# Patient Record
Sex: MALE | Race: WHITE | Employment: FULL TIME | URBAN - METROPOLITAN AREA
[De-identification: names, ages, dates, MRNs, and addresses within clinical notes are randomized per-mention and may not be internally consistent; named-entity substitution may affect disease eponyms.]

---

## 2017-02-27 ENCOUNTER — TELEPHONE (OUTPATIENT)
Dept: FAMILY MEDICINE CLINIC | Age: 67
End: 2017-02-27

## 2017-02-27 DIAGNOSIS — E11.9 TYPE 2 DIABETES MELLITUS WITHOUT COMPLICATION, WITHOUT LONG-TERM CURRENT USE OF INSULIN (HCC): Primary | ICD-10-CM

## 2017-02-27 DIAGNOSIS — E11.9 TYPE 2 DIABETES MELLITUS WITHOUT COMPLICATION, WITHOUT LONG-TERM CURRENT USE OF INSULIN (HCC): ICD-10-CM

## 2017-02-27 DIAGNOSIS — E78.00 PURE HYPERCHOLESTEROLEMIA: ICD-10-CM

## 2017-02-27 DIAGNOSIS — Z12.5 PROSTATE CANCER SCREENING: ICD-10-CM

## 2017-02-27 DIAGNOSIS — E55.9 HYPOVITAMINOSIS D: ICD-10-CM

## 2017-02-27 NOTE — TELEPHONE ENCOUNTER
Pt is requesting a referral to see a Vascular Dr Evelyne Marcus  Reason: ultrasound on lower exterminties  Tel:  [phone]  Fax: [phone] attn Julia Dewey

## 2017-02-27 NOTE — TELEPHONE ENCOUNTER
This is being handled by our referrals coordinator.   She has reported to me that the patient does not actually need a referral.

## 2017-03-03 ENCOUNTER — HOSPITAL ENCOUNTER (OUTPATIENT)
Dept: LAB | Age: 67
Discharge: HOME | End: 2017-03-03

## 2017-03-03 PROCEDURE — 99001 SPECIMEN HANDLING PT-LAB: CPT | Performed by: INTERNAL MEDICINE

## 2017-03-04 LAB
25(OH)D3+25(OH)D2 SERPL-MCNC: 25.3 NG/ML (ref 30–100)
ALBUMIN SERPL-MCNC: 4.3 G/DL (ref 3.6–4.8)
ALBUMIN/CREAT UR: 8.6 MG/G CREAT (ref 0–30)
ALP SERPL-CCNC: 87 IU/L (ref 39–117)
ALT SERPL-CCNC: 13 IU/L (ref 0–44)
APPEARANCE UR: CLEAR
AST SERPL-CCNC: 14 IU/L (ref 0–40)
BASOPHILS # BLD AUTO: 0.1 X10E3/UL (ref 0–0.2)
BASOPHILS NFR BLD AUTO: 1 %
BILIRUB DIRECT SERPL-MCNC: 0.12 MG/DL (ref 0–0.4)
BILIRUB SERPL-MCNC: 0.4 MG/DL (ref 0–1.2)
BILIRUB UR QL STRIP: NEGATIVE
BUN SERPL-MCNC: 30 MG/DL (ref 8–27)
BUN/CREAT SERPL: 17 (ref 10–22)
CALCIUM SERPL-MCNC: 9.5 MG/DL (ref 8.6–10.2)
CHLORIDE SERPL-SCNC: 101 MMOL/L (ref 96–106)
CHOLEST SERPL-MCNC: 180 MG/DL (ref 100–199)
CO2 SERPL-SCNC: 21 MMOL/L (ref 18–29)
COLOR UR: YELLOW
CREAT SERPL-MCNC: 1.77 MG/DL (ref 0.76–1.27)
CREAT UR-MCNC: 196.5 MG/DL
EOSINOPHIL # BLD AUTO: 0.2 X10E3/UL (ref 0–0.4)
EOSINOPHIL NFR BLD AUTO: 2 %
ERYTHROCYTE [DISTWIDTH] IN BLOOD BY AUTOMATED COUNT: 13.7 % (ref 12.3–15.4)
GLUCOSE SERPL-MCNC: 123 MG/DL (ref 65–99)
GLUCOSE UR QL: NEGATIVE
HBA1C MFR BLD: 6.3 % (ref 4.8–5.6)
HCT VFR BLD AUTO: 39.4 % (ref 37.5–51)
HDLC SERPL-MCNC: 30 MG/DL
HGB BLD-MCNC: 13 G/DL (ref 12.6–17.7)
HGB UR QL STRIP: NEGATIVE
IMM GRANULOCYTES # BLD: 0 X10E3/UL (ref 0–0.1)
IMM GRANULOCYTES NFR BLD: 0 %
INTERPRETATION, 910389: NORMAL
INTERPRETATION: NORMAL
KETONES UR QL STRIP: NEGATIVE
LDLC SERPL CALC-MCNC: 103 MG/DL (ref 0–99)
LEUKOCYTE ESTERASE UR QL STRIP: NEGATIVE
LYMPHOCYTES # BLD AUTO: 2.3 X10E3/UL (ref 0.7–3.1)
LYMPHOCYTES NFR BLD AUTO: 28 %
Lab: NORMAL
MCH RBC QN AUTO: 28.9 PG (ref 26.6–33)
MCHC RBC AUTO-ENTMCNC: 33 G/DL (ref 31.5–35.7)
MCV RBC AUTO: 88 FL (ref 79–97)
MICRO URNS: NORMAL
MICROALBUMIN UR-MCNC: 16.9 UG/ML
MONOCYTES # BLD AUTO: 0.6 X10E3/UL (ref 0.1–0.9)
MONOCYTES NFR BLD AUTO: 8 %
NEUTROPHILS # BLD AUTO: 4.9 X10E3/UL (ref 1.4–7)
NEUTROPHILS NFR BLD AUTO: 61 %
NITRITE UR QL STRIP: NEGATIVE
PDF IMAGE, 910387: NORMAL
PH UR STRIP: 5 [PH] (ref 5–7.5)
PLATELET # BLD AUTO: 316 X10E3/UL (ref 150–379)
POTASSIUM SERPL-SCNC: 4.5 MMOL/L (ref 3.5–5.2)
PROT SERPL-MCNC: 7.2 G/DL (ref 6–8.5)
PROT UR QL STRIP: NEGATIVE
PSA SERPL-MCNC: 0.5 NG/ML (ref 0–4)
RBC # BLD AUTO: 4.5 X10E6/UL (ref 4.14–5.8)
SODIUM SERPL-SCNC: 139 MMOL/L (ref 134–144)
SP GR UR: 1.02 (ref 1–1.03)
T4 FREE SERPL-MCNC: 1.25 NG/DL (ref 0.82–1.77)
TRIGL SERPL-MCNC: 236 MG/DL (ref 0–149)
TSH SERPL DL<=0.005 MIU/L-ACNC: 1.77 UIU/ML (ref 0.45–4.5)
UROBILINOGEN UR STRIP-MCNC: 0.2 MG/DL (ref 0.2–1)
VLDLC SERPL CALC-MCNC: 47 MG/DL (ref 5–40)
WBC # BLD AUTO: 8.1 X10E3/UL (ref 3.4–10.8)

## 2017-03-24 ENCOUNTER — OFFICE VISIT (OUTPATIENT)
Dept: FAMILY MEDICINE CLINIC | Age: 67
End: 2017-03-24

## 2017-03-24 VITALS
BODY MASS INDEX: 28.58 KG/M2 | HEIGHT: 69 IN | OXYGEN SATURATION: 98 % | WEIGHT: 193 LBS | RESPIRATION RATE: 18 BRPM | TEMPERATURE: 98 F | DIASTOLIC BLOOD PRESSURE: 84 MMHG | HEART RATE: 73 BPM | SYSTOLIC BLOOD PRESSURE: 130 MMHG

## 2017-03-24 DIAGNOSIS — M79.604 PAIN IN BOTH LOWER EXTREMITIES: ICD-10-CM

## 2017-03-24 DIAGNOSIS — Z12.11 COLON CANCER SCREENING: ICD-10-CM

## 2017-03-24 DIAGNOSIS — E11.9 TYPE 2 DIABETES MELLITUS WITHOUT COMPLICATION, WITHOUT LONG-TERM CURRENT USE OF INSULIN (HCC): ICD-10-CM

## 2017-03-24 DIAGNOSIS — E55.9 HYPOVITAMINOSIS D: ICD-10-CM

## 2017-03-24 DIAGNOSIS — E78.00 PURE HYPERCHOLESTEROLEMIA: ICD-10-CM

## 2017-03-24 DIAGNOSIS — I77.1 ARTERIAL INSUFFICIENCY (HCC): ICD-10-CM

## 2017-03-24 DIAGNOSIS — M79.605 PAIN IN BOTH LOWER EXTREMITIES: ICD-10-CM

## 2017-03-24 DIAGNOSIS — Z00.00 ANNUAL PHYSICAL EXAM: Primary | ICD-10-CM

## 2017-03-24 DIAGNOSIS — I10 ESSENTIAL HYPERTENSION: ICD-10-CM

## 2017-03-24 DIAGNOSIS — I73.9 SEVERE ARTERIAL INSUFFICIENCY OF LEFT LOWER EXTREMITY (HCC): ICD-10-CM

## 2017-03-24 RX ORDER — LISINOPRIL AND HYDROCHLOROTHIAZIDE 12.5; 2 MG/1; MG/1
TABLET ORAL
Qty: 180 TAB | Refills: 2 | Status: SHIPPED | OUTPATIENT
Start: 2017-03-24 | End: 2022-04-19

## 2017-03-24 RX ORDER — AMLODIPINE BESYLATE 5 MG/1
5 TABLET ORAL DAILY
Qty: 90 TAB | Refills: 2 | Status: SHIPPED | OUTPATIENT
Start: 2017-03-24

## 2017-03-24 RX ORDER — GLIPIZIDE 2.5 MG/1
TABLET, EXTENDED RELEASE ORAL
Qty: 90 TAB | Refills: 2 | Status: SHIPPED | OUTPATIENT
Start: 2017-03-24

## 2017-03-24 RX ORDER — ATORVASTATIN CALCIUM 20 MG/1
TABLET, FILM COATED ORAL
Qty: 90 TAB | Refills: 2 | Status: SHIPPED | OUTPATIENT
Start: 2017-03-24

## 2017-03-24 RX ORDER — HYDROXYZINE 25 MG/1
25 TABLET, FILM COATED ORAL
Qty: 90 TAB | Refills: 2 | Status: SHIPPED | OUTPATIENT
Start: 2017-03-24 | End: 2022-04-19 | Stop reason: ALTCHOICE

## 2017-03-24 NOTE — PATIENT INSTRUCTIONS

## 2017-03-24 NOTE — PROGRESS NOTES
SUBJECTIVE:   Laura Cota is a 73 y.o. male presenting for his annual checkup. He is a diabetic and has not been compliant with follow care as we discussed before. His last visit to me was May 2016. Past Medical History:   Diagnosis Date    Chronic low back pain 5/4/2016    HLD (hyperlipidemia) 11/11/2015    HTN (hypertension) 10/20/2015    Hypertension 2009     [facility]       Allergies: Metformin     Current Outpatient Prescriptions   Medication Sig    glipiZIDE SR (GLUCOTROL XL) 2.5 mg CR tablet TAKE 1 TABLET DAILY    atorvastatin (LIPITOR) 20 mg tablet TAKE 1 TABLET DAILY    lisinopril-hydroCHLOROthiazide (PRINZIDE, ZESTORETIC) 20-12.5 mg per tablet TAKE 1 TABLET TWICE A DAY    amLODIPine (NORVASC) 5 mg tablet Take 1 Tab by mouth daily.  hydrOXYzine HCl (ATARAX) 25 mg tablet Take 1 Tab by mouth daily as needed for Itching.  glucose blood VI test strips (ONETOUCH ULTRA TEST) strip Once daily    Lancets misc Once daily     No current facility-administered medications for this visit. ROS:  Feeling well. No dyspnea or chest pain on exertion. No abdominal pain, change in bowel habits, black or bloody stools. No urinary tract or prostatic symptoms. No neurological complaints. OBJECTIVE:   The patient appears well, alert, oriented x 3, in no distress. Visit Vitals    /84    Pulse 73    Temp 98 °F (36.7 °C)    Resp 18    Ht 5' 9\" (1.753 m)    Wt 193 lb (87.5 kg)    SpO2 98%    BMI 28.5 kg/m2       General appearance: alert, cooperative, no distress, appears stated age  Head: Normocephalic, without obvious abnormality, atraumatic  Ears: normal TM's and external ear canals AU  Throat: Lips, mucosa, and tongue normal. Teeth and gums normal  Neck: supple, symmetrical, trachea midline, no adenopathy, thyroid: not enlarged, symmetric, no tenderness/mass/nodules, no carotid bruit and no JVD  Back: symmetric, no curvature.  ROM normal. No CVA tenderness. Lungs: clear to auscultation bilaterally  Heart: regular rate and rhythm, S1, S2 normal, no murmur, click, rub or gallop  Abdomen: soft, non-tender. Bowel sounds normal. No masses,  no organomegaly  Extremities: extremities normal, atraumatic, no cyanosis or edema  Skin: Skin color, texture, turgor normal. No rashes or lesions  Neurological is normal, no focal findings. Lab Results   Component Value Date/Time    WBC 8.1 03/03/2017 08:48 AM    HGB 13.0 03/03/2017 08:48 AM    HCT 39.4 03/03/2017 08:48 AM    PLATELET 784 72/66/5866 08:48 AM    MCV 88 03/03/2017 08:48 AM         Lab Results   Component Value Date/Time    Sodium 139 03/03/2017 08:48 AM    Potassium 4.5 03/03/2017 08:48 AM    Chloride 101 03/03/2017 08:48 AM    CO2 21 03/03/2017 08:48 AM    Glucose 123 03/03/2017 08:48 AM    BUN 30 03/03/2017 08:48 AM    Creatinine 1.77 03/03/2017 08:48 AM    BUN/Creatinine ratio 17 03/03/2017 08:48 AM    GFR est AA 46 03/03/2017 08:48 AM    GFR est non-AA 39 03/03/2017 08:48 AM    Calcium 9.5 03/03/2017 08:48 AM         Lab Results   Component Value Date/Time    ALT (SGPT) 13 03/03/2017 08:48 AM    AST (SGOT) 14 03/03/2017 08:48 AM    Alk.  phosphatase 87 03/03/2017 08:48 AM    Bilirubin, direct 0.12 03/03/2017 08:48 AM    Bilirubin, total 0.4 03/03/2017 08:48 AM         Lab Results   Component Value Date/Time    Cholesterol, total 180 03/03/2017 08:48 AM    HDL Cholesterol 30 03/03/2017 08:48 AM    LDL, calculated 103 03/03/2017 08:48 AM    VLDL, calculated 47 03/03/2017 08:48 AM    Triglyceride 236 03/03/2017 08:48 AM         Lab Results   Component Value Date/Time    TSH 1.770 03/03/2017 08:48 AM    T4, Free 1.25 03/03/2017 08:48 AM         Lab Results   Component Value Date/Time    VITAMIN D, 25-HYDROXY 25.3 03/03/2017 08:48 AM           ASSESSMENT:   healthy adult male    Kalin was seen today for complete physical.    Diagnoses and all orders for this visit:    Annual physical exam    Type 2 diabetes mellitus without complication, without long-term current use of insulin (HCC)  -     REFERRAL TO OPHTHALMOLOGY  -      DIABETES FOOT EXAM    Essential hypertension    Pure hypercholesterolemia  -     LIPID PANEL; Future  -     METABOLIC PANEL, BASIC; Future    Colon cancer screening  -     REFERRAL TO GASTROENTEROLOGY    Pain in both lower extremities  -     DUPLEX LOWER EXT VENOUS BILAT; Future  -     ANKLE BRACHIAL INDEX; Future    Hypovitaminosis D  -     VITAMIN D, 25 HYDROXY; Future    Other orders  -     glipiZIDE SR (GLUCOTROL XL) 2.5 mg CR tablet; TAKE 1 TABLET DAILY  -     atorvastatin (LIPITOR) 20 mg tablet; TAKE 1 TABLET DAILY  -     lisinopril-hydroCHLOROthiazide (PRINZIDE, ZESTORETIC) 20-12.5 mg per tablet; TAKE 1 TABLET TWICE A DAY  -     amLODIPine (NORVASC) 5 mg tablet; Take 1 Tab by mouth daily. -     hydrOXYzine HCl (ATARAX) 25 mg tablet; Take 1 Tab by mouth daily as needed for Itching. patient still dealing with pain, numbness and tingling in his legs when he walks. Improves when he stops, feels better when sitting vs standing. He was not very pleased with the vascular MD he once saw. He wants to have vascular studies done again and perhaps be referred to new specialist.      PLAN:   follow a low fat, low cholesterol diet and attempt to lose weight. The plan was discussed with the patient. The patient verbalized understanding and is in agreement with the plan. All medication potential side effects were discussed with the patient. Pt to f/u in 3 mon, repeat labs. Start Vit D 1000 units daily. Probiotics for dyspepsia, bloating, gas. Increase water, dietary fiber. Use Miralax as needed for constipation.

## 2017-03-24 NOTE — PROGRESS NOTES
Daryl Kearney is a 73 y.o. male here today for complete physical           1. Have you been to the ER, urgent care clinic since your last visit? Hospitalized since your last visit? No    2. Have you seen or consulted any other health care providers outside of the [address] [place] [address] since your last visit? Include any pap smears or colon screening.  Yes Where: Ortho, Cardiovascular, Neurosurgeon

## 2017-03-24 NOTE — MR AVS SNAPSHOT
Visit Information Date & Time Provider Department Dept. Phone Encounter #  
 3/24/2017  2:30 PM Alisa Holloway, [place] [id number] Upcoming Health Maintenance Date Due Hepatitis C Screening 1950 FOOT EXAM Q1 9/24/1961 DTaP/Tdap/Td series (1 - Tdap) 9/24/1972 FOBT Q 1 YEAR AGE 50-75 9/24/2001 ZOSTER VACCINE AGE 60> 9/24/2011 INFLUENZA AGE 9 TO ADULT 8/1/2016 Pneumococcal 65+ Low/Medium Risk (1 of 2 - PCV13) 9/24/2016 MEDICARE YEARLY EXAM 9/24/2016 EYE EXAM RETINAL OR DILATED Q1 3/28/2017 HEMOGLOBIN A1C Q6M 9/3/2017 MICROALBUMIN Q1 3/3/2018 LIPID PANEL Q1 3/3/2018 GLAUCOMA SCREENING Q2Y 3/28/2018 Allergies as of 3/24/2017  Review Complete On: 3/24/2017 By: Alisa Holloway MD  
  
 Severity Noted Reaction Type Reactions Metformin  11/11/2015    Diarrhea Current Immunizations  Reviewed on 3/24/2017 Name Date Influenza High Dose Vaccine PF 11/8/2016 Influenza Vaccine 10/20/2015 Tdap 6/14/2016 Reviewed by Alisa Holloway MD on 3/24/2017 at  2:47 PM  
You Were Diagnosed With   
  
 Codes Comments Annual physical exam    -  Primary ICD-10-CM: Z00.00 ICD-9-CM: V70.0 Type 2 diabetes mellitus without complication, without long-term current use of insulin (HCC)     ICD-10-CM: E11.9 ICD-9-CM: 250.00 Essential hypertension     ICD-10-CM: I10 
ICD-9-CM: 401.9 Pure hypercholesterolemia     ICD-10-CM: E78.00 ICD-9-CM: 272.0 Colon cancer screening     ICD-10-CM: Z12.11 ICD-9-CM: V76.51 Pain in both lower extremities     ICD-10-CM: M79.604, M79.605 ICD-9-CM: 729.5 Hypovitaminosis D     ICD-10-CM: E55.9 ICD-9-CM: 268.9 Vitals BP Pulse Temp Resp Height(growth percentile) Weight(growth percentile) 130/84 73 98 °F (36.7 °C) 18 5' 9\" (1.753 m) 193 lb (87.5 kg) SpO2 BMI Smoking Status 98% 28.5 kg/m2 Former Smoker Vitals History BMI and BSA Data Body Mass Index Body Surface Area 28.5 kg/m 2 2.06 m 2 Preferred Pharmacy Pharmacy Name Phone [address] [phone] Your Updated Medication List  
  
   
This list is accurate as of: 3/24/17  3:20 PM.  Always use your most recent med list. amLODIPine 5 mg tablet Commonly known as:  Aleena Koko Take 1 Tab by mouth daily. atorvastatin 20 mg tablet Commonly known as:  LIPITOR  
TAKE 1 TABLET DAILY  
  
 glipiZIDE SR 2.5 mg CR tablet Commonly known as:  GLUCOTROL XL  
TAKE 1 TABLET DAILY  
  
 glucose blood VI test strips strip Commonly known as:  ONETOUCH ULTRA TEST Once daily  
  
 hydrOXYzine HCl 25 mg tablet Commonly known as:  ATARAX Take 1 Tab by mouth daily as needed for Itching. Lancets Misc Once daily  
  
 lisinopril-hydroCHLOROthiazide 20-12.5 mg per tablet Commonly known as:  PRINZIDE, ZESTORETIC  
TAKE 1 TABLET TWICE A DAY Prescriptions Printed Refills  
 glipiZIDE SR (GLUCOTROL XL) 2.5 mg CR tablet 2 Sig: TAKE 1 TABLET DAILY Class: Print  
 atorvastatin (LIPITOR) 20 mg tablet 2 Sig: TAKE 1 TABLET DAILY Class: Print  
 lisinopril-hydroCHLOROthiazide (PRINZIDE, ZESTORETIC) 20-12.5 mg per tablet 2 Sig: TAKE 1 TABLET TWICE A DAY Class: Print  
 amLODIPine (NORVASC) 5 mg tablet 2 Sig: Take 1 Tab by mouth daily. Class: Print Route: Oral  
 hydrOXYzine HCl (ATARAX) 25 mg tablet 2 Sig: Take 1 Tab by mouth daily as needed for Itching. Class: Print Route: Oral  
  
We Performed the Following  DIABETES FOOT EXAM [HM7 Custom] REFERRAL TO GASTROENTEROLOGY [XKD83 Custom] Comments: PLEASE SCHEDULE PT FOR NEXT AVAILABLE CONSULTATION WITH [facility]. REFERRAL TO OPHTHALMOLOGY [REF57 Custom] Comments:  
 PT WILL MAKE APPT with Dr. Adalid Rodriguez at [facility] To-Do List   
 03/24/2017 Imaging:  ANKLE BRACHIAL INDEX   
  
 03/24/2017 Imaging:  DUPLEX LOWER EXT VENOUS BILAT   
  
 03/24/2017 Lab:  LIPID PANEL   
  
 03/24/2017 Lab:  METABOLIC PANEL, BASIC   
  
 03/24/2017 Lab:  VITAMIN D, 25 HYDROXY Referral Information Referral ID Referred By Referred To  
  
 [id number] [facility]   
   [address] [place], [address] Phone: [phone] Fax: [phone] Visits Status Start Date End Date 1 New Request 3/24/17 3/24/18 If your referral has a status of pending review or denied, additional information will be sent to support the outcome of this decision. Referral ID Referred By Referred To  
 [id number] Phuong MCCAIN Not Available Visits Status Start Date End Date 1 New Request 3/24/17 3/24/18 If your referral has a status of pending review or denied, additional information will be sent to support the outcome of this decision. Patient Instructions Nutrition Tips for Diabetes: After Your Visit Your Care Instructions A healthy diet is important to manage diabetes. It helps you lose weight (if you need to) and keep it off. It gives you the nutrition and energy your body needs and helps prevent heart disease. But a diet for diabetes does not mean that you have to eat special foods. You can eat what your family eats, including occasional sweets and other favorites. But you do have to pay attention to how often you eat and how much you eat of certain foods. The right plan for you will give you meals that help you keep your blood sugar at healthy levels. Try to eat a variety of foods and to spread carbohydrate throughout the day. Carbohydrate raises blood sugar higher and more quickly than any other nutrient does. Carbohydrate is found in sugar, breads and cereals, fruit, starchy vegetables such as potatoes and corn, and milk and yogurt. You may want to work with a dietitian or diabetes educator to help you plan meals and snacks. A dietitian or diabetes educator also can help you lose weight if that is one of your goals. The following tips can help you enjoy your meals and stay healthy. Follow-up care is a key part of your treatment and safety. Be sure to make and go to all appointments, and call your doctor if you are having problems. Its also a good idea to know your test results and keep a list of the medicines you take. How can you care for yourself at home? · Learn which foods have carbohydrate and how much carbohydrate to eat. A dietitian or diabetes educator can help you learn to keep track of how much carbohydrate you eat. · Spread carbohydrate throughout the day. Eat some carbohydrate at all meals, but do not eat too much at any one time. · Plan meals to include food from all the food groups. These are the food groups and some example portion sizes: ¨ Grains: 1 slice of bread (1 ounce), ½ cup of cooked cereal, and 1/3 cup of cooked pasta or rice. These have about 15 grams of carbohydrate in a serving. Choose whole grains such as whole wheat bread or crackers, oatmeal, and brown rice more often than refined grains. ¨ Fruit: 1 small fresh fruit, such as an apple or orange; ½ of a banana; ½ cup of chopped, cooked, or canned fruit; ½ cup of fruit juice; 1 cup of melon or raspberries; and 2 tablespoons of dried fruit. These have about 15 grams of carbohydrate in a serving. ¨ Dairy: 1 cup of nonfat or low-fat milk and 2/3 cup of plain yogurt. These have about 15 grams of carbohydrate in a serving. ¨ Protein foods: Beef, chicken, turkey, fish, eggs, tofu, cheese, cottage cheese, and peanut butter. A serving size of meat is 3 ounces, which is about the size of a deck of cards.  Examples of meat substitute serving sizes (equal to 1 ounce of meat) are 1/4 cup of cottage cheese, 1 egg, 1 tablespoon of peanut butter, and ½ cup of tofu. These have very little or no carbohydrate per serving. ¨ Vegetables: Starchy vegetables such as ½ cup of cooked dried beans, peas, potatoes, or corn have about 15 grams of carbohydrate. Nonstarchy vegetables have very little carbohydrate, such as 1 cup of raw leafy vegetables (such as spinach), ½ cup of other vegetables (cooked or chopped), and 3/4 cup of vegetable juice. · Use the plate format to plan meals. It is a good, quick way to make sure that you have a balanced meal. It also helps you spread carbohydrate throughout the day. You divide your plate by types of foods. Put vegetables on half the plate, meat or meat substitutes on one-quarter of the plate, and a grain or starchy vegetable (such as brown rice or a potato) in the final quarter of the plate. To this you can add a small piece of fruit and 1 cup of milk or yogurt, depending on how much carbohydrate you are supposed to eat at a meal. 
· Talk to your dietitian or diabetes educator about ways to add limited amounts of sweets into your meal plan. You can eat these foods now and then, as long as you include the amount of carbohydrate they have in your daily carbohydrate allowance. · If you drink alcohol, limit it to no more than 1 drink a day for women and 2 drinks a day for men. If you are pregnant, no amount of alcohol is known to be safe. · Protein, fat, and fiber do not raise blood sugar as much as carbohydrate does. If you eat a lot of these nutrients in a meal, your blood sugar will rise more slowly than it would otherwise. · Limit saturated fats, such as those from meat and dairy products. Try to replace it with monounsaturated fat, such as olive oil. This is a healthier choice because people who have diabetes are at higher-than-average risk of heart disease. But use a modest amount of olive oil. A tablespoon of olive oil has 14 grams of fat and 120 calories. · Exercise lowers blood sugar. If you take insulin by shots or pump, you can use less than you would if you were not exercising. Keep in mind that timing matters. If you exercise within 1 hour after a meal, your body may need less insulin for that meal than it would if you exercised 3 hours after the meal. Test your blood sugar to find out how exercise affects your need for insulin. · Exercise on most days of the week. Aim for at least 30 minutes. Exercise helps you stay at a healthy weight and helps your body use insulin. Walking is an easy way to get exercise. Gradually increase the amount you walk every day. You also may want to swim, bike, or do other activities. When you eat out · Learn to estimate the serving sizes of foods that have carbohydrate. If you measure food at home, it will be easier to estimate the amount in a serving of restaurant food. · If the meal you order has too much carbohydrate (such as potatoes, corn, or baked beans), ask to have a low-carbohydrate food instead. Ask for a salad or green vegetables. · If you use insulin, check your blood sugar before and after eating out to help you plan how much to eat in the future. · If you eat more carbohydrate at a meal than you had planned, take a walk or do other exercise. This will help lower your blood sugar. Where can you learn more? Go to [employer] Enter M464 in the search box to learn more about \"Nutrition Tips for Diabetes: After Your Visit. \"  
© 3093-7809 [employer]. Care instructions adapted under license by [facility] (which disclaims liability or warranty for this information). This care instruction is for use with your licensed healthcare professional. If you have questions about a medical condition or this instruction, always ask your healthcare professional. [address] any warranty or liability for your use of this information. Content Version: 26.0.399559; Current as of: June 4, 2014 Introducing [facility] SERVICES! Krystal Bhat introduces [employer] patient portal. Now you can access parts of your medical record, email your doctor's office, and request medication refills online. 1. In your internet browser, go to [email or web address] 2. Click on the First Time User? Click Here link in the Sign In box. You will see the New Member Sign Up page. 3. Enter your [employer] Access Code exactly as it appears below. You will not need to use this code after youve completed the sign-up process. If you do not sign up before the expiration date, you must request a new code. · [employer] Access Code: [email or web address] Expires: 6/22/2017  3:20 PM 
 
4. Enter the last four digits of your Social Security Number (xxxx) and Date of Birth (mm/dd/yyyy) as indicated and click Submit. You will be taken to the next sign-up page. 5. Create a [employer] ID. This will be your [employer] login ID and cannot be changed, so think of one that is secure and easy to remember. 6. Create a [employer] password. You can change your password at any time. 7. Enter your Password Reset Question and Answer. This can be used at a later time if you forget your password. 8. Enter your e-mail address. You will receive e-mail notification when new information is available in [address] 9. Click Sign Up. You can now view and download portions of your medical record. 10. Click the Download Summary menu link to download a portable copy of your medical information. If you have questions, please visit the Frequently Asked Questions section of the [employer] website. Remember, [employer] is NOT to be used for urgent needs. For medical emergencies, dial 911. Now available from your iPhone and Android! Please provide this summary of care documentation to your next provider. Your primary care clinician is listed as Rikki Arriaza. If you have any questions after today's visit, please call [phone].

## 2017-04-26 ENCOUNTER — TELEPHONE (OUTPATIENT)
Dept: FAMILY MEDICINE CLINIC | Age: 67
End: 2017-04-26

## 2017-04-26 NOTE — TELEPHONE ENCOUNTER
Needing additional diagnosis for the tests ordered, scheduled for tomorrow. Please contact back so they don't have to reschedule.   Fax: [phone]

## 2017-06-12 ENCOUNTER — ANESTHESIA EVENT (OUTPATIENT)
Dept: SURGERY | Age: 67
DRG: 039 | End: 2017-06-12
Payer: COMMERCIAL

## 2017-06-13 ENCOUNTER — HOSPITAL ENCOUNTER (INPATIENT)
Age: 67
LOS: 1 days | Discharge: HOME | DRG: 039 | End: 2017-06-14
Attending: SURGERY | Admitting: SURGERY
Payer: COMMERCIAL

## 2017-06-13 ENCOUNTER — ANESTHESIA (OUTPATIENT)
Dept: SURGERY | Age: 67
DRG: 039 | End: 2017-06-13
Payer: COMMERCIAL

## 2017-06-13 PROBLEM — I65.29 CAROTID ARTERY STENOSIS WITHOUT CEREBRAL INFARCTION: Status: ACTIVE | Noted: 2017-06-13

## 2017-06-13 LAB
ABO + RH BLD: NORMAL
ANION GAP BLD CALC-SCNC: 8 MMOL/L (ref 3–18)
ATRIAL RATE: 47 BPM
ATRIAL RATE: 59 BPM
BLOOD GROUP ANTIBODIES SERPL: NORMAL
BUN BLD-MCNC: 33 MG/DL (ref 7–18)
BUN SERPL-MCNC: 33 MG/DL (ref 7–18)
BUN/CREAT SERPL: 18 (ref 12–20)
CALCIUM SERPL-MCNC: 8.7 MG/DL (ref 8.5–10.1)
CALCULATED P AXIS, ECG09: 57 DEGREES
CALCULATED P AXIS, ECG09: 60 DEGREES
CALCULATED R AXIS, ECG10: 46 DEGREES
CALCULATED R AXIS, ECG10: 53 DEGREES
CALCULATED T AXIS, ECG11: 132 DEGREES
CALCULATED T AXIS, ECG11: 136 DEGREES
CHLORIDE BLD-SCNC: 109 MMOL/L (ref 100–108)
CHLORIDE SERPL-SCNC: 109 MMOL/L (ref 100–108)
CO2 SERPL-SCNC: 23 MMOL/L (ref 21–32)
CREAT SERPL-MCNC: 1.83 MG/DL (ref 0.6–1.3)
DIAGNOSIS, 93000: NORMAL
DIAGNOSIS, 93000: NORMAL
GLUCOSE BLD STRIP.AUTO-MCNC: 118 MG/DL (ref 70–110)
GLUCOSE BLD STRIP.AUTO-MCNC: 121 MG/DL (ref 74–106)
GLUCOSE SERPL-MCNC: 120 MG/DL (ref 74–99)
HCT VFR BLD CALC: 35 % (ref 36–49)
HGB BLD-MCNC: 11.9 G/DL (ref 12–16)
INR PPP: 1 (ref 0.8–1.2)
P-R INTERVAL, ECG05: 152 MS
P-R INTERVAL, ECG05: 170 MS
POTASSIUM BLD-SCNC: 4.6 MMOL/L (ref 3.5–5.5)
POTASSIUM SERPL-SCNC: 4.5 MMOL/L (ref 3.5–5.5)
PROTHROMBIN TIME: 12.8 SEC (ref 11.5–15.2)
Q-T INTERVAL, ECG07: 442 MS
Q-T INTERVAL, ECG07: 472 MS
QRS DURATION, ECG06: 86 MS
QRS DURATION, ECG06: 90 MS
QTC CALCULATION (BEZET), ECG08: 417 MS
QTC CALCULATION (BEZET), ECG08: 437 MS
SODIUM BLD-SCNC: 142 MMOL/L (ref 136–145)
SODIUM SERPL-SCNC: 140 MMOL/L (ref 136–145)
SPECIMEN EXP DATE BLD: NORMAL
VENTRICULAR RATE, ECG03: 47 BPM
VENTRICULAR RATE, ECG03: 59 BPM

## 2017-06-13 PROCEDURE — 86900 BLOOD TYPING SEROLOGIC ABO: CPT | Performed by: NURSE ANESTHETIST, CERTIFIED REGISTERED

## 2017-06-13 PROCEDURE — 77030003029 HC SUT VCRL J&J -B: Performed by: SURGERY

## 2017-06-13 PROCEDURE — 76060000036 HC ANESTHESIA 2.5 TO 3 HR: Performed by: SURGERY

## 2017-06-13 PROCEDURE — 65610000006 HC RM INTENSIVE CARE

## 2017-06-13 PROCEDURE — 77030005521 HC CATH URETH FOL38 BARD -B: Performed by: SURGERY

## 2017-06-13 PROCEDURE — 74011250636 HC RX REV CODE- 250/636: Performed by: NURSE ANESTHETIST, CERTIFIED REGISTERED

## 2017-06-13 PROCEDURE — 77030011640 HC PAD GRND REM COVD -A: Performed by: SURGERY

## 2017-06-13 PROCEDURE — 74011250637 HC RX REV CODE- 250/637

## 2017-06-13 PROCEDURE — 03CL0ZZ EXTIRPATION OF MATTER FROM LEFT INTERNAL CAROTID ARTERY, OPEN APPROACH: ICD-10-PCS | Performed by: SURGERY

## 2017-06-13 PROCEDURE — 77030012407 HC DRN WND BARD -B: Performed by: SURGERY

## 2017-06-13 PROCEDURE — 77030032490 HC SLV COMPR SCD KNE COVD -B: Performed by: SURGERY

## 2017-06-13 PROCEDURE — 74011000258 HC RX REV CODE- 258

## 2017-06-13 PROCEDURE — 77030013079 HC BLNKT BAIR HGGR 3M -A: Performed by: ANESTHESIOLOGY

## 2017-06-13 PROCEDURE — 74011250636 HC RX REV CODE- 250/636: Performed by: SURGERY

## 2017-06-13 PROCEDURE — 77030018836 HC SOL IRR NACL ICUM -A: Performed by: SURGERY

## 2017-06-13 PROCEDURE — 77030002996 HC SUT SLK J&J -A: Performed by: SURGERY

## 2017-06-13 PROCEDURE — 77030010507 HC ADH SKN DERMBND J&J -B: Performed by: SURGERY

## 2017-06-13 PROCEDURE — 03CN0ZZ EXTIRPATION OF MATTER FROM LEFT EXTERNAL CAROTID ARTERY, OPEN APPROACH: ICD-10-PCS | Performed by: SURGERY

## 2017-06-13 PROCEDURE — 85347 COAGULATION TIME ACTIVATED: CPT

## 2017-06-13 PROCEDURE — 76210000002 HC OR PH I REC 3 TO 3.5 HR: Performed by: SURGERY

## 2017-06-13 PROCEDURE — 77030027478 HC TBNG JP W BLB MRTM -B: Performed by: SURGERY

## 2017-06-13 PROCEDURE — 77030008683 HC TU ET CUF COVD -A: Performed by: ANESTHESIOLOGY

## 2017-06-13 PROCEDURE — 77030011894 HC TY FOL URIMTR BARD -B: Performed by: SURGERY

## 2017-06-13 PROCEDURE — 80048 BASIC METABOLIC PNL TOTAL CA: CPT | Performed by: ANESTHESIOLOGY

## 2017-06-13 PROCEDURE — 93882 EXTRACRANIAL UNI/LTD STUDY: CPT

## 2017-06-13 PROCEDURE — 77030002987 HC SUT PROL J&J -B: Performed by: SURGERY

## 2017-06-13 PROCEDURE — 77030011265 HC ELECTRD BLD HEX COVD -A: Performed by: SURGERY

## 2017-06-13 PROCEDURE — 76010000172 HC OR TIME 2.5 TO 3 HR INTENSV-TIER 1: Performed by: SURGERY

## 2017-06-13 PROCEDURE — 36415 COLL VENOUS BLD VENIPUNCTURE: CPT | Performed by: NURSE ANESTHETIST, CERTIFIED REGISTERED

## 2017-06-13 PROCEDURE — 93005 ELECTROCARDIOGRAM TRACING: CPT

## 2017-06-13 PROCEDURE — 74011000250 HC RX REV CODE- 250: Performed by: SURGERY

## 2017-06-13 PROCEDURE — 77030002933 HC SUT MCRYL J&J -A: Performed by: SURGERY

## 2017-06-13 PROCEDURE — 74011250636 HC RX REV CODE- 250/636

## 2017-06-13 PROCEDURE — 77030013797 HC KT TRNSDUC PRSSR EDWD -A: Performed by: ANESTHESIOLOGY

## 2017-06-13 PROCEDURE — 77030031139 HC SUT VCRL2 J&J -A: Performed by: SURGERY

## 2017-06-13 PROCEDURE — 74011000250 HC RX REV CODE- 250

## 2017-06-13 PROCEDURE — 77030021678 HC GLIDESCP STAT DISP VERT -B: Performed by: ANESTHESIOLOGY

## 2017-06-13 PROCEDURE — 03UL0KZ SUPPLEMENT LEFT INTERNAL CAROTID ARTERY WITH NONAUTOLOGOUS TISSUE SUBSTITUTE, OPEN APPROACH: ICD-10-PCS | Performed by: SURGERY

## 2017-06-13 PROCEDURE — 82962 GLUCOSE BLOOD TEST: CPT

## 2017-06-13 PROCEDURE — 77030018824 HC SHNT CAR ARGY COVD -B: Performed by: SURGERY

## 2017-06-13 PROCEDURE — 85610 PROTHROMBIN TIME: CPT | Performed by: ANESTHESIOLOGY

## 2017-06-13 PROCEDURE — 74011000272 HC RX REV CODE- 272: Performed by: SURGERY

## 2017-06-13 PROCEDURE — 77030008477 HC STYL SATN SLP COVD -A: Performed by: ANESTHESIOLOGY

## 2017-06-13 PROCEDURE — 77030005401 HC CATH RAD ARRO -A: Performed by: ANESTHESIOLOGY

## 2017-06-13 PROCEDURE — 82947 ASSAY GLUCOSE BLOOD QUANT: CPT

## 2017-06-13 PROCEDURE — C1768 GRAFT, VASCULAR: HCPCS | Performed by: SURGERY

## 2017-06-13 DEVICE — XENOSURE BIOLOGIC PATCH, 0.8CM X 8CM, EIFU
Type: IMPLANTABLE DEVICE | Site: CAROTID | Status: FUNCTIONAL
Brand: XENOSURE BIOLOGIC PATCH

## 2017-06-13 RX ORDER — NEOSTIGMINE METHYLSULFATE 5 MG/5 ML
SYRINGE (ML) INTRAVENOUS AS NEEDED
Status: DISCONTINUED | OUTPATIENT
Start: 2017-06-13 | End: 2017-06-13 | Stop reason: HOSPADM

## 2017-06-13 RX ORDER — MAGNESIUM SULFATE 100 %
4 CRYSTALS MISCELLANEOUS AS NEEDED
Status: DISCONTINUED | OUTPATIENT
Start: 2017-06-13 | End: 2017-06-13 | Stop reason: HOSPADM

## 2017-06-13 RX ORDER — NITROGLYCERIN 40 MG/100ML
5-200 INJECTION INTRAVENOUS
Status: DISCONTINUED | OUTPATIENT
Start: 2017-06-13 | End: 2017-06-14 | Stop reason: HOSPADM

## 2017-06-13 RX ORDER — SODIUM CHLORIDE, SODIUM LACTATE, POTASSIUM CHLORIDE, CALCIUM CHLORIDE 600; 310; 30; 20 MG/100ML; MG/100ML; MG/100ML; MG/100ML
50 INJECTION, SOLUTION INTRAVENOUS CONTINUOUS
Status: DISCONTINUED | OUTPATIENT
Start: 2017-06-14 | End: 2017-06-13 | Stop reason: HOSPADM

## 2017-06-13 RX ORDER — GLYCOPYRROLATE 0.2 MG/ML
INJECTION INTRAMUSCULAR; INTRAVENOUS AS NEEDED
Status: DISCONTINUED | OUTPATIENT
Start: 2017-06-13 | End: 2017-06-13 | Stop reason: HOSPADM

## 2017-06-13 RX ORDER — ROCURONIUM BROMIDE 10 MG/ML
INJECTION, SOLUTION INTRAVENOUS AS NEEDED
Status: DISCONTINUED | OUTPATIENT
Start: 2017-06-13 | End: 2017-06-13 | Stop reason: HOSPADM

## 2017-06-13 RX ORDER — IBUPROFEN 400 MG/1
400 TABLET ORAL
Status: DISCONTINUED | OUTPATIENT
Start: 2017-06-13 | End: 2017-06-14 | Stop reason: HOSPADM

## 2017-06-13 RX ORDER — SUCCINYLCHOLINE CHLORIDE 20 MG/ML
INJECTION INTRAMUSCULAR; INTRAVENOUS AS NEEDED
Status: DISCONTINUED | OUTPATIENT
Start: 2017-06-13 | End: 2017-06-13 | Stop reason: HOSPADM

## 2017-06-13 RX ORDER — FENTANYL CITRATE 50 UG/ML
INJECTION, SOLUTION INTRAMUSCULAR; INTRAVENOUS AS NEEDED
Status: DISCONTINUED | OUTPATIENT
Start: 2017-06-13 | End: 2017-06-13 | Stop reason: HOSPADM

## 2017-06-13 RX ORDER — DOPAMINE HYDROCHLORIDE 160 MG/100ML
5-10 INJECTION, SOLUTION INTRAVENOUS
Status: DISCONTINUED | OUTPATIENT
Start: 2017-06-13 | End: 2017-06-14 | Stop reason: HOSPADM

## 2017-06-13 RX ORDER — HEPARIN SODIUM 1000 [USP'U]/ML
INJECTION, SOLUTION INTRAVENOUS; SUBCUTANEOUS AS NEEDED
Status: DISCONTINUED | OUTPATIENT
Start: 2017-06-13 | End: 2017-06-13 | Stop reason: HOSPADM

## 2017-06-13 RX ORDER — HYDROXYZINE 25 MG/1
25 TABLET, FILM COATED ORAL
Status: DISCONTINUED | OUTPATIENT
Start: 2017-06-13 | End: 2017-06-14 | Stop reason: HOSPADM

## 2017-06-13 RX ORDER — SODIUM CHLORIDE, SODIUM LACTATE, POTASSIUM CHLORIDE, CALCIUM CHLORIDE 600; 310; 30; 20 MG/100ML; MG/100ML; MG/100ML; MG/100ML
125 INJECTION, SOLUTION INTRAVENOUS CONTINUOUS
Status: DISCONTINUED | OUTPATIENT
Start: 2017-06-13 | End: 2017-06-13 | Stop reason: HOSPADM

## 2017-06-13 RX ORDER — HYDROMORPHONE HYDROCHLORIDE 2 MG/ML
0.2 INJECTION, SOLUTION INTRAMUSCULAR; INTRAVENOUS; SUBCUTANEOUS
Status: DISCONTINUED | OUTPATIENT
Start: 2017-06-13 | End: 2017-06-13 | Stop reason: HOSPADM

## 2017-06-13 RX ORDER — DEXTROSE MONOHYDRATE 25 G/50ML
25-50 INJECTION, SOLUTION INTRAVENOUS AS NEEDED
Status: DISCONTINUED | OUTPATIENT
Start: 2017-06-13 | End: 2017-06-13 | Stop reason: HOSPADM

## 2017-06-13 RX ORDER — FAMOTIDINE 20 MG/1
TABLET, FILM COATED ORAL
Status: COMPLETED
Start: 2017-06-13 | End: 2017-06-13

## 2017-06-13 RX ORDER — OXYCODONE AND ACETAMINOPHEN 5; 325 MG/1; MG/1
1 TABLET ORAL ONCE
Status: DISCONTINUED | OUTPATIENT
Start: 2017-06-13 | End: 2017-06-13 | Stop reason: HOSPADM

## 2017-06-13 RX ORDER — FAMOTIDINE 20 MG/1
20 TABLET, FILM COATED ORAL ONCE
Status: COMPLETED | OUTPATIENT
Start: 2017-06-14 | End: 2017-06-13

## 2017-06-13 RX ORDER — SODIUM CHLORIDE 0.9 % (FLUSH) 0.9 %
5-10 SYRINGE (ML) INJECTION EVERY 8 HOURS
Status: DISCONTINUED | OUTPATIENT
Start: 2017-06-13 | End: 2017-06-14 | Stop reason: HOSPADM

## 2017-06-13 RX ORDER — LIDOCAINE HYDROCHLORIDE 10 MG/ML
0.1 INJECTION, SOLUTION EPIDURAL; INFILTRATION; INTRACAUDAL; PERINEURAL AS NEEDED
Status: DISCONTINUED | OUTPATIENT
Start: 2017-06-13 | End: 2017-06-13 | Stop reason: HOSPADM

## 2017-06-13 RX ORDER — NITROGLYCERIN 40 MG/100ML
INJECTION INTRAVENOUS AS NEEDED
Status: DISCONTINUED | OUTPATIENT
Start: 2017-06-13 | End: 2017-06-13 | Stop reason: HOSPADM

## 2017-06-13 RX ORDER — FENTANYL CITRATE 50 UG/ML
50 INJECTION, SOLUTION INTRAMUSCULAR; INTRAVENOUS AS NEEDED
Status: DISCONTINUED | OUTPATIENT
Start: 2017-06-13 | End: 2017-06-13 | Stop reason: HOSPADM

## 2017-06-13 RX ORDER — NALOXONE HYDROCHLORIDE 0.4 MG/ML
0.04 INJECTION, SOLUTION INTRAMUSCULAR; INTRAVENOUS; SUBCUTANEOUS AS NEEDED
Status: DISCONTINUED | OUTPATIENT
Start: 2017-06-13 | End: 2017-06-13 | Stop reason: HOSPADM

## 2017-06-13 RX ORDER — CEFAZOLIN SODIUM 2 G/50ML
2 SOLUTION INTRAVENOUS
Status: COMPLETED | OUTPATIENT
Start: 2017-06-13 | End: 2017-06-13

## 2017-06-13 RX ORDER — MIDAZOLAM HYDROCHLORIDE 1 MG/ML
INJECTION, SOLUTION INTRAMUSCULAR; INTRAVENOUS AS NEEDED
Status: DISCONTINUED | OUTPATIENT
Start: 2017-06-13 | End: 2017-06-13 | Stop reason: HOSPADM

## 2017-06-13 RX ORDER — HYDROCHLOROTHIAZIDE 25 MG/1
12.5 TABLET ORAL 2 TIMES DAILY
Status: DISCONTINUED | OUTPATIENT
Start: 2017-06-13 | End: 2017-06-14 | Stop reason: HOSPADM

## 2017-06-13 RX ORDER — SODIUM CHLORIDE 0.9 % (FLUSH) 0.9 %
5-10 SYRINGE (ML) INJECTION AS NEEDED
Status: DISCONTINUED | OUTPATIENT
Start: 2017-06-13 | End: 2017-06-13 | Stop reason: HOSPADM

## 2017-06-13 RX ORDER — LIDOCAINE HYDROCHLORIDE 20 MG/ML
INJECTION, SOLUTION EPIDURAL; INFILTRATION; INTRACAUDAL; PERINEURAL AS NEEDED
Status: DISCONTINUED | OUTPATIENT
Start: 2017-06-13 | End: 2017-06-13 | Stop reason: HOSPADM

## 2017-06-13 RX ORDER — ONDANSETRON 2 MG/ML
4 INJECTION INTRAMUSCULAR; INTRAVENOUS
Status: DISCONTINUED | OUTPATIENT
Start: 2017-06-13 | End: 2017-06-14 | Stop reason: HOSPADM

## 2017-06-13 RX ORDER — HYDROMORPHONE HYDROCHLORIDE 1 MG/ML
1 INJECTION, SOLUTION INTRAMUSCULAR; INTRAVENOUS; SUBCUTANEOUS
Status: DISCONTINUED | OUTPATIENT
Start: 2017-06-13 | End: 2017-06-14 | Stop reason: HOSPADM

## 2017-06-13 RX ORDER — AMLODIPINE BESYLATE 5 MG/1
5 TABLET ORAL DAILY
Status: DISCONTINUED | OUTPATIENT
Start: 2017-06-14 | End: 2017-06-14 | Stop reason: HOSPADM

## 2017-06-13 RX ORDER — PROPOFOL 10 MG/ML
INJECTION, EMULSION INTRAVENOUS AS NEEDED
Status: DISCONTINUED | OUTPATIENT
Start: 2017-06-13 | End: 2017-06-13 | Stop reason: HOSPADM

## 2017-06-13 RX ORDER — ONDANSETRON 2 MG/ML
INJECTION INTRAMUSCULAR; INTRAVENOUS AS NEEDED
Status: DISCONTINUED | OUTPATIENT
Start: 2017-06-13 | End: 2017-06-13 | Stop reason: HOSPADM

## 2017-06-13 RX ORDER — HYDROCODONE BITARTRATE AND ACETAMINOPHEN 5; 325 MG/1; MG/1
1 TABLET ORAL
Status: DISCONTINUED | OUTPATIENT
Start: 2017-06-13 | End: 2017-06-14 | Stop reason: HOSPADM

## 2017-06-13 RX ORDER — ONDANSETRON 2 MG/ML
4 INJECTION INTRAMUSCULAR; INTRAVENOUS ONCE
Status: DISCONTINUED | OUTPATIENT
Start: 2017-06-13 | End: 2017-06-13 | Stop reason: HOSPADM

## 2017-06-13 RX ORDER — INSULIN LISPRO 100 [IU]/ML
INJECTION, SOLUTION INTRAVENOUS; SUBCUTANEOUS ONCE
Status: DISCONTINUED | OUTPATIENT
Start: 2017-06-13 | End: 2017-06-13 | Stop reason: HOSPADM

## 2017-06-13 RX ORDER — NICARDIPINE HYDROCHLORIDE 0.1 MG/ML
INJECTION INTRAVENOUS
Status: DISCONTINUED | OUTPATIENT
Start: 2017-06-13 | End: 2017-06-13 | Stop reason: HOSPADM

## 2017-06-13 RX ORDER — SODIUM CHLORIDE 0.9 % (FLUSH) 0.9 %
5-10 SYRINGE (ML) INJECTION AS NEEDED
Status: DISCONTINUED | OUTPATIENT
Start: 2017-06-13 | End: 2017-06-14 | Stop reason: HOSPADM

## 2017-06-13 RX ORDER — LISINOPRIL 20 MG/1
20 TABLET ORAL 2 TIMES DAILY
Status: DISCONTINUED | OUTPATIENT
Start: 2017-06-13 | End: 2017-06-14 | Stop reason: HOSPADM

## 2017-06-13 RX ORDER — PROTAMINE SULFATE 10 MG/ML
INJECTION, SOLUTION INTRAVENOUS AS NEEDED
Status: DISCONTINUED | OUTPATIENT
Start: 2017-06-13 | End: 2017-06-13 | Stop reason: HOSPADM

## 2017-06-13 RX ADMIN — FAMOTIDINE 20 MG: 20 TABLET ORAL at 09:47

## 2017-06-13 RX ADMIN — FENTANYL CITRATE 50 MCG: 50 INJECTION, SOLUTION INTRAMUSCULAR; INTRAVENOUS at 15:20

## 2017-06-13 RX ADMIN — Medication 10 ML: at 22:07

## 2017-06-13 RX ADMIN — Medication 4 MG: at 13:15

## 2017-06-13 RX ADMIN — ROCURONIUM BROMIDE 10 MG: 10 INJECTION, SOLUTION INTRAVENOUS at 11:49

## 2017-06-13 RX ADMIN — FENTANYL CITRATE 100 MCG: 50 INJECTION, SOLUTION INTRAMUSCULAR; INTRAVENOUS at 11:03

## 2017-06-13 RX ADMIN — HEPARIN SODIUM 9000 UNITS: 1000 INJECTION, SOLUTION INTRAVENOUS; SUBCUTANEOUS at 11:45

## 2017-06-13 RX ADMIN — ROCURONIUM BROMIDE 5 MG: 10 INJECTION, SOLUTION INTRAVENOUS at 12:29

## 2017-06-13 RX ADMIN — NICARDIPINE HYDROCHLORIDE 2.5 MG/HR: 0.1 INJECTION INTRAVENOUS at 12:25

## 2017-06-13 RX ADMIN — FENTANYL CITRATE 50 MCG: 50 INJECTION, SOLUTION INTRAMUSCULAR; INTRAVENOUS at 14:02

## 2017-06-13 RX ADMIN — PROPOFOL 30 MG: 10 INJECTION, EMULSION INTRAVENOUS at 13:09

## 2017-06-13 RX ADMIN — SODIUM CHLORIDE, SODIUM LACTATE, POTASSIUM CHLORIDE, AND CALCIUM CHLORIDE 50 ML/HR: 600; 310; 30; 20 INJECTION, SOLUTION INTRAVENOUS at 10:09

## 2017-06-13 RX ADMIN — PROPOFOL 150 MG: 10 INJECTION, EMULSION INTRAVENOUS at 11:05

## 2017-06-13 RX ADMIN — ROCURONIUM BROMIDE 5 MG: 10 INJECTION, SOLUTION INTRAVENOUS at 11:04

## 2017-06-13 RX ADMIN — LIDOCAINE HYDROCHLORIDE 100 MG: 20 INJECTION, SOLUTION EPIDURAL; INFILTRATION; INTRACAUDAL; PERINEURAL at 11:05

## 2017-06-13 RX ADMIN — PROTAMINE SULFATE 20 MG: 10 INJECTION, SOLUTION INTRAVENOUS at 12:45

## 2017-06-13 RX ADMIN — CEFAZOLIN SODIUM 2 G: 2 SOLUTION INTRAVENOUS at 11:16

## 2017-06-13 RX ADMIN — FENTANYL CITRATE 50 MCG: 50 INJECTION, SOLUTION INTRAMUSCULAR; INTRAVENOUS at 13:57

## 2017-06-13 RX ADMIN — GLYCOPYRROLATE 0.2 MG: 0.2 INJECTION INTRAMUSCULAR; INTRAVENOUS at 11:22

## 2017-06-13 RX ADMIN — FENTANYL CITRATE 50 MCG: 50 INJECTION, SOLUTION INTRAMUSCULAR; INTRAVENOUS at 16:11

## 2017-06-13 RX ADMIN — ROCURONIUM BROMIDE 50 MG: 10 INJECTION, SOLUTION INTRAVENOUS at 11:12

## 2017-06-13 RX ADMIN — GLYCOPYRROLATE 0.3 MG: 0.2 INJECTION INTRAMUSCULAR; INTRAVENOUS at 13:15

## 2017-06-13 RX ADMIN — FAMOTIDINE 20 MG: 20 TABLET, FILM COATED ORAL at 09:47

## 2017-06-13 RX ADMIN — ONDANSETRON 4 MG: 2 INJECTION INTRAMUSCULAR; INTRAVENOUS at 12:50

## 2017-06-13 RX ADMIN — MIDAZOLAM HYDROCHLORIDE 2 MG: 1 INJECTION, SOLUTION INTRAMUSCULAR; INTRAVENOUS at 10:52

## 2017-06-13 RX ADMIN — NITROGLYCERIN 40 MCG: 40 INJECTION INTRAVENOUS at 13:27

## 2017-06-13 RX ADMIN — NITROGLYCERIN 40 MCG: 40 INJECTION INTRAVENOUS at 13:24

## 2017-06-13 RX ADMIN — SUCCINYLCHOLINE CHLORIDE 100 MG: 20 INJECTION INTRAMUSCULAR; INTRAVENOUS at 11:05

## 2017-06-13 NOTE — PROGRESS NOTES
TRANSFER - IN REPORT:    Verbal report received from Anamika Baron RN(name) on Hayden Score  being received from [facility]) for routine progression of care      Report consisted of patients Situation, Background, Assessment and   Recommendations(SBAR). Information from the following report(s) OR Summary, MAR and Cardiac Rhythm sinus silas was reviewed with the receiving nurse. Opportunity for questions and clarification was provided. Assessment completed upon patients arrival to unit and care assumed.

## 2017-06-13 NOTE — ANESTHESIA POSTPROCEDURE EVALUATION
Post-Anesthesia Evaluation and Assessment    Patient: Rich Calzada MRN: [id number]  SSN: [id number]    YOB: 1950  Age: 73 y.o. Sex: male       Cardiovascular Function/Vital Signs  Visit Vitals    /41    Pulse (!) 56    Temp 36.4 °C (97.5 °F)    Resp 12    Ht 5' 11\" (1.803 m)    Wt 88 kg (194 lb)    SpO2 98%    BMI 27.06 kg/m2       Patient is status post general anesthesia for Procedure(s):  left CAROTID  ENDARTERECTOMY with intraoperative ultrasound. Post-Anesthesia Evaluation and Assessment    Patient: Rich Calzada MRN: [id number]  SSN: [id number]    YOB: 1950  Age: 73 y.o. Sex: male      Data from PACU flowsheet    Cardiovascular Function/Vital Signs  Visit Vitals    /41    Pulse (!) 56    Temp 36.4 °C (97.5 °F)    Resp 12    Ht 5' 11\" (1.803 m)    Wt 88 kg (194 lb)    SpO2 98%    BMI 27.06 kg/m2       Patient is status post anesthesia    Nausea/Vomiting: controlled    Postoperative hydration reviewed and adequate. Pain:  Managed    Neurological Status: At baseline    Mental Status and Level of Consciousness: Alert and oriented     Pulmonary Status:   Adequate oxygenation and airway patent    Complications related to anesthesia: None    Post-anesthesia assessment completed.  No concerns    Signed By: Atanacio Schilder, CRNA     June 13, 2017                Signed By: Atanacio Schilder, CRNA     June 13, 2017

## 2017-06-13 NOTE — H&P
I have reviewed the chart on [address] , patient was seen and examined and I agree with the documentation as charted. Kevin Steve MD,FACS

## 2017-06-13 NOTE — PERIOP NOTES
Anesthesia at bedside. Checking arterial line. Made few adjustments. Arterial line seems to be matching BP cuff.

## 2017-06-13 NOTE — PERIOP NOTES
TRANSFER - OUT REPORT:    Verbal report given to Manuel Beckham RN on Gm Raines  being transferred to  (Neuro ICU) for routine post - op       Report consisted of patients Situation, Background, Assessment and   Recommendations(SBAR). Information from the following report(s) SBAR, Kardex, Procedure Summary, MAR, Recent Results and Med Rec Status was reviewed with the receiving nurse. Lines:   Peripheral IV 06/13/17 Left Arm (Active)   Site Assessment Clean, dry, & intact 6/13/2017  6:24 PM   Phlebitis Assessment 0 6/13/2017  6:24 PM   Infiltration Assessment 0 6/13/2017  6:24 PM   Dressing Status Clean, dry, & intact 6/13/2017  6:24 PM   Dressing Type Transparent;Tape 6/13/2017  6:24 PM   Hub Color/Line Status Pink; Infusing 6/13/2017  6:24 PM   Action Taken Open ports on tubing capped 6/13/2017  6:24 PM   Alcohol Cap Used Yes 6/13/2017  6:24 PM       Arterial Line 06/13/17 Left Radial artery (Active)   Site Assessment Clean, dry, & intact 6/13/2017  6:24 PM   Dressing Status Clean, dry, & intact 6/13/2017  6:24 PM   Dressing Type Tape;Transparent 6/13/2017  6:24 PM   Line Status Intact and in place 6/13/2017  6:24 PM   Treatment Arm board on 6/13/2017  6:24 PM   Affected Extremity/Extremities Color distal to insertion site pink (or appropriate for race) 6/13/2017  6:24 PM        Opportunity for questions and clarification was provided.       Patient transported with:   Registered Nurse

## 2017-06-13 NOTE — IP AVS SNAPSHOT
Kathy Service 
 
 
 [address] 
[phone] Patient: Arya Mcadams MRN: [id number] UPB:7/93/7508 You are allergic to the following Allergen Reactions Metformin Diarrhea Recent Documentation Height Weight BMI Smoking Status 1.803 m 92 kg 28.29 kg/m2 Former Smoker Emergency Contacts Name Discharge Info Relation Home Work Mobile Primus Madai  Spouse [3] [phone] About your hospitalization You were admitted on:  June 13, 2017 You last received care in the:  [facility] [address] ICU You were discharged on:  June 14, 2017 Unit phone number:  [phone] Why you were hospitalized Your primary diagnosis was:  Not on File Your diagnoses also included:  Carotid Artery Stenosis Without Cerebral Infarction Providers Seen During Your Hospitalizations Provider Role Specialty Primary office phone Soraida Jacob MD Attending Provider Vascular Surgery [phone] Your Primary Care Physician (PCP) Primary Care Physician Office Phone Office Fax Michele Bhagat [phone] Follow-up Information Follow up With Details Comments Contact Info MD [address] [address] [phone] Your Appointments Thursday July 06, 2017  8:30 AM EDT Follow Up with Catrina Mcginnis MD  
[facility]) Ana Swanson [address] [place] [id number] [phone] Current Discharge Medication List  
  
START taking these medications Dose & Instructions Dispensing Information Comments Morning Noon Evening Bedtime  
 aspirin 81 mg chewable tablet Your last dose was: Your next dose is:    
   
   
 Dose:  81 mg Take 1 Tab by mouth daily. Quantity:  90 Tab Refills:  0 HYDROcodone-acetaminophen 5-325 mg per tablet Commonly known as:  1463 Katie Rolle Your last dose was: Your next dose is:    
   
   
 Dose:  1 Tab Take 1 Tab by mouth every four (4) hours as needed. Max Daily Amount: 6 Tabs. Quantity:  20 Tab Refills:  0 CONTINUE these medications which have NOT CHANGED Dose & Instructions Dispensing Information Comments Morning Noon Evening Bedtime  
 amLODIPine 5 mg tablet Commonly known as:  Charlene Half Your last dose was: Your next dose is:    
   
   
 Dose:  5 mg Take 1 Tab by mouth daily. Quantity:  90 Tab Refills:  2  
     
   
   
   
  
 atorvastatin 20 mg tablet Commonly known as:  LIPITOR Your last dose was: Your next dose is: TAKE 1 TABLET DAILY Quantity:  90 Tab Refills:  2  
     
   
   
   
  
 glipiZIDE SR 2.5 mg CR tablet Commonly known as:  GLUCOTROL XL Your last dose was: Your next dose is: TAKE 1 TABLET DAILY Quantity:  90 Tab Refills:  2  
     
   
   
   
  
 glucose blood VI test strips strip Commonly known as:  ONETOUCH ULTRA TEST Your last dose was: Your next dose is: Once daily Quantity:  100 Strip Refills:  2  
     
   
   
   
  
 hydrOXYzine HCl 25 mg tablet Commonly known as:  ATARAX Your last dose was: Your next dose is:    
   
   
 Dose:  25 mg Take 1 Tab by mouth daily as needed for Itching. Quantity:  90 Tab Refills:  2 Lancets Misc Your last dose was: Your next dose is: Once daily Quantity:  100 Each Refills:  2  
     
   
   
   
  
 lisinopril-hydroCHLOROthiazide 20-12.5 mg per tablet Commonly known as:  Wyilaa Nose Your last dose was: Your next dose is: TAKE 1 TABLET TWICE A DAY Quantity:  180 Tab Refills:  2 Where to Get Your Medications These medications were sent to [address] [facility] acute [facility], [facility] [address] Phone:  [phone]  
  aspirin 81 mg chewable tablet Information on where to get these meds will be given to you by the nurse or doctor. ! Ask your nurse or doctor about these medications HYDROcodone-acetaminophen 5-325 mg per tablet Discharge Instructions None Discharge Instructions Attachments/References CAROTID ENDARTERECTOMY: POST-OP (ENGLISH) Discharge Orders None [facility] Announcement We are excited to announce that we are making your provider's discharge notes available to you in [employer]. You will see these notes when they are completed and signed by the physician that discharged you from your recent hospital stay. If you have any questions or concerns about any information you see in [employer], please call the Health Information Department where you were seen or reach out to your Primary Care Provider for more information about your plan of care. Introducing \[facility] [place] [facility]\"" & [facility] SERVICES! Zoey Corral introduces [employer] patient portal. Now you can access parts of your medical record, email your doctor's office, and request medication refills online. 1. In your internet browser, go to [email or web address] 2. Click on the First Time User? Click Here link in the Sign In box. You will see the New Member Sign Up page. 3. Enter your [employer] Access Code exactly as it appears below. You will not need to use this code after youve completed the sign-up process. If you do not sign up before the expiration date, you must request a new code. · [employer] Access Code: [email or web address] Expires: 6/22/2017  3:20 PM 
 
4. Enter the last four digits of your Social Security Number (xxxx) and Date of Birth (mm/dd/yyyy) as indicated and click Submit.  You will be taken to the next sign-up page. 5. Create a [employer] ID. This will be your [employer] login ID and cannot be changed, so think of one that is secure and easy to remember. 6. Create a [employer] password. You can change your password at any time. 7. Enter your Password Reset Question and Answer. This can be used at a later time if you forget your password. 8. Enter your e-mail address. You will receive e-mail notification when new information is available in [address] 9. Click Sign Up. You can now view and download portions of your medical record. 10. Click the Download Summary menu link to download a portable copy of your medical information. If you have questions, please visit the Frequently Asked Questions section of the [employer] website. Remember, [employer] is NOT to be used for urgent needs. For medical emergencies, dial 911. Now available from your iPhone and Android! General Information Please provide this summary of care documentation to your next provider. Patient Signature:  ____________________________________________________________ Date:  ____________________________________________________________  
  
[facility] Provider Signature:  ____________________________________________________________ Date:  ____________________________________________________________ More Information Carotid Endarterectomy: What to Expect at [facility] Your Recovery A carotid endarterectomy (say \"kuh-RAW-tid cb-fcr-bjb-REK-tuh-monse\") is surgery to remove fatty build-up (plaque) from one of the carotid arteries. There are two carotid arteriesone on each side of the neckthat supply blood to the brain. When plaque builds up in either artery, it can make it hard for blood to flow to the brain. This surgery may lower your risk of having a stroke. You may have a sore throat for a few days.  You can expect the cut (incision) in your neck to be sore for about a week. The area around the incision may also be swollen and bruised at first. The area in front of the incision may be numb. This usually gets better after 6 to 12 months. Your doctor closed the incision in your neck with stitches. The stitches will be removed 7 to 10 days after surgery, or you may have stitches that dissolve on their own. You may feel more tired than usual for several weeks after surgery. You will probably be able to go back to work or your usual activities in 1 to 2 weeks. This care sheet gives you a general idea about how long it will take for you to recover. But each person recovers at a different pace. Follow the steps below to feel better as quickly as possible. How can you care for yourself at home? Activity · Rest when you feel tired. Getting enough sleep will help you recover. · Try to walk each day. Start by walking a little more than you did the day before. Bit by bit, increase the amount you walk. Walking boosts blood flow and helps prevent pneumonia and constipation. · Avoid strenuous activities, such as bicycle riding, jogging, weight lifting, or aerobic exercise. Your doctor will tell you when it's okay to do strenuous activity. · For 1 to 2 weeks, avoid lifting anything that would make you strain. This may include a child, heavy grocery bags and milk containers, a heavy briefcase or backpack, cat litter or dog food bags, or a vacuum . · Ask your doctor when you can drive again. · You will probably need to take 1 to 2 weeks off from work. It depends on the type of work you do and how you feel. · You may shower and take baths as usual. But do not soak the incision for the first 2 weeks, or until your doctor tells you it is okay. Pat the incision dry. · Your doctor will tell you when you can have sex again. Diet · You can eat your normal diet.  If your stomach is upset, try bland, low-fat foods like plain rice, broiled chicken, toast, and yogurt. · Drink plenty of fluids (unless your doctor tells you not to). · You may notice that your bowel movements are not regular right after your surgery. This is common. Try to avoid constipation and straining with bowel movements. You may want to take a fiber supplement every day. If you have not had a bowel movement after a couple of days, ask your doctor about taking a mild laxative. Medicines · Your doctor will tell you if and when you can restart your medicines. He or she will also give you instructions about taking any new medicines. · If you take blood thinners, such as warfarin (Coumadin), clopidogrel (Plavix), or aspirin, be sure to talk to your doctor. He or she will tell you if and when to start taking those medicines again. Make sure that you understand exactly what your doctor wants you to do. · Your doctor may advise you to take aspirin when you go home. This helps prevent blood clots. Take your medicines exactly as prescribed. Call your doctor if you think you are having a problem with your medicine. · Be safe with medicines. Take pain medicines exactly as directed. ¨ If the doctor gave you a prescription medicine for pain, take it as prescribed. ¨ If you are not taking a prescription pain medicine, ask your doctor if you can take an over-the-counter medicine. ¨ Do not take two or more pain medicines at the same time unless the doctor told you to. Many pain medicines have acetaminophen, which is Tylenol. Too much acetaminophen (Tylenol) can be harmful. · If you think your pain medicine is making you sick to your stomach: 
¨ Take your medicine after meals (unless your doctor has told you not to). ¨ Ask your doctor for a different pain medicine. · If your doctor prescribed antibiotics, take them as directed. Do not stop taking them just because you feel better. You need to take the full course of antibiotics. · If you take a blood thinner, such as aspirin, be sure you get instructions about how to take your medicine safely. Blood thinners can cause serious bleeding problems. Incision care · If you have strips of tape over your incision, leave the tape on for a week or until it falls off. · Wash the area daily with water and pat it dry. Other cleaning products, such as hydrogen peroxide, can make the wound heal more slowly. You may cover the area with a gauze bandage if it weeps or rubs against clothing. Change the bandage every day. · Keep the area clean and dry. Follow-up care is a key part of your treatment and safety. Be sure to make and go to all appointments, and call your doctor if you are having problems. It's also a good idea to know your test results and keep a list of the medicines you take. When should you call for help? Call 911 anytime you think you may need emergency care. For example, call if: 
· You passed out (lost consciousness). · You have severe trouble breathing. · You have a tight bulge in your neck on the side where the surgery was done. · You have symptoms of a stroke. These may include: 
¨ Sudden numbness, tingling, weakness, or loss of movement in your face, arm, or leg, especially on only one side of your body. ¨ Sudden vision changes. ¨ Sudden trouble speaking. ¨ Sudden confusion or trouble understanding simple statements. ¨ Sudden problems with walking or balance. ¨ A sudden, severe headache that is different from past headaches. · You have chest pain or pressure. This may occur with: ¨ Sweating. ¨ Shortness of breath. ¨ Nausea or vomiting. ¨ Pain that spreads from the chest to the neck, jaw, or one or both shoulders or arms. ¨ Dizziness or lightheadedness. ¨ A fast or uneven pulse. After calling 911, chew 1 adult-strength or 2 to 4 low-dose aspirin. Wait for an ambulance. Do not try to drive yourself. Call your doctor now or seek immediate medical care if: · You are sick to your stomach or cannot keep fluids down. · You have pain that does not get better after you take pain medicine. · You have a fever over 100°F. 
· You have loose stitches, or your incision comes open. · Bright red blood has soaked through the bandage over your incision. · You have signs of infection, such as: 
¨ Increased pain, swelling, warmth, or redness. ¨ Red streaks leading from the incisions. ¨ Pus draining from the incisions. ¨ Swollen lymph nodes in your neck, armpits, or groin. ¨ A fever. Watch closely for any changes in your health, and be sure to contact your doctor if you have any problems. Where can you learn more? Go to http://richard-christopher.info/. Enter Q498 in the search box to learn more about \"Carotid Endarterectomy: What to Expect at Home. \" Current as of: January 27, 2016 Content Version: 11.2 © 8896-5117 [employer]. Care instructions adapted under license by [employer] (which disclaims liability or warranty for this information). If you have questions about a medical condition or this instruction, always ask your healthcare professional. [address] any warranty or liability for your use of this information.

## 2017-06-13 NOTE — BRIEF OP NOTE
BRIEF OPERATIVE NOTE    Date of Procedure: 6/13/2017   Preoperative Diagnosis: i65.22 left carotid stenosis  Postoperative Diagnosis: DEVENDRA   Procedure(s):  left CAROTID  ENDARTERECTOMY with bovine patch angioplasty   intraoperative ultrasound  Surgeon(s) and Role:     * Darrell Augustin MD - Primary         Assistant Staff:  Physician Assistant: Melissa Diop    Surgical Staff:  Circ-1: Bernardino Isidro  Circ-2: Caroline Cali  Physician Assistant: Melissa Diop  Scrub Tech-1: Daryle Nobles  Scrub Tech-2: Brendan Gonzales  Surg Asst-1: [address] Staff: Jenna Collins  Event Time In   Incision Start 1128   Incision Close 1310     Anesthesia: General   Estimated Blood Loss: 100 cc  Specimens: * No specimens in log *   Findings: normal intraoperative ultrasound  Complications: none  Implants:   Implant Name Type Inv. Item Serial No.  Lot No. LRB No. Used Action   PATCH VASC PERIPATCH 0.8X8CM -- Kiran Subramanian - [id number]   PATCH VASC PERIPATCH 0.8X8CM -- [employer] [id number] Left 1 Implanted         JULIANNA Francois MD, FACS

## 2017-06-13 NOTE — PROGRESS NOTES
Cardiologist Dr. Elijah Cabrera called back. He thinks the EKG has not changed significantly. Since patient is asymptomatic, no need for further work up.

## 2017-06-13 NOTE — PROGRESS NOTES
PACU nurse MADELINE Contreras notified me there was some ST depression in EKG monitoring. Patient denies CP or SOB. He does have left sided jaw pain which is constant. 12 lead EKG in PACU showed new T wave inversions in V2,V3,V4 and V5, HR reduced from 59 to 47, compared to pre-op EKG. Will get cardiology consult.

## 2017-06-13 NOTE — ANESTHESIA PREPROCEDURE EVALUATION
Anesthetic History               Review of Systems / Medical History  Patient summary reviewed, nursing notes reviewed and pertinent labs reviewed    Pulmonary                   Neuro/Psych              Cardiovascular    Hypertension: poorly controlled          PAD    Exercise tolerance: >4 METS     GI/Hepatic/Renal                Endo/Other    Diabetes: poorly controlled, type 2         Other Findings   Comments:   Risk Factors for Postoperative nausea/vomiting:       History of postoperative nausea/vomiting? NO       Female? YES       Motion sickness? NO       Intended opioid administration for postoperative analgesia? YES      Smoking Abstinence  Current Smoker? NO  Elective Surgery? YES  Seen preoperatively by anesthesiologist or proxy prior to day of surgery? YES  Pt abstained from smoking 24 hours prior to anesthesia?  N/A         Physical Exam    Airway  Mallampati: II  TM Distance: > 6 cm  Neck ROM: normal range of motion   Mouth opening: Normal     Cardiovascular    Rhythm: regular  Rate: normal         Dental    Dentition: Poor dentition     Pulmonary  Breath sounds clear to auscultation               Abdominal  GI exam deferred       Other Findings            Anesthetic Plan    ASA: 3  Anesthesia type: general          Induction: Intravenous  Anesthetic plan and risks discussed with: Patient

## 2017-06-13 NOTE — CONSULTS
Hospitalist Consult Note    Consult Requested by Vascular Service    Patient: Chris Ba               Sex: male          DOA: 6/13/2017       YOB: 1950      Age:  73 y.o.        LOS:  LOS: 0 days        HPI:     Chris Ba is a 73 y.o. male who was admitted with known carotid stenosis. He is also known to have HTN, Diabetes, and peripheral arterial disease. He has had weakness, but no altered mental status. He also has had occasional pain in his legs. He has not had chest pain or diaphoresis. He reports that his blood sugar has been relatively well controlled recently. He has not had shortness of breath. We are consulted for ongoing management. Past Medical History:   Diagnosis Date    Chronic low back pain 5/4/2016    HLD (hyperlipidemia) 11/11/2015    HTN (hypertension) 10/20/2015    Hypertension 2009     Type II diabetes mellitus ([facility] [address] 10/23/2015       Social History:   Tobacco use:  Patient quit smoking five years ago   Alcohol use:  Patient occasionally drinks alcohol    Family History:   Multiple family members with HTN    Review of Systems    Constitutional:  No fever or weight loss  HEENT:  No headache or visual changes  Cardiovascular:  No chest pain or diaphoresis  Respiratory:  No coughing, wheezing, or shortness of breath. GI:  No nausea or vomitting. No diarrhea  :  No hematuria or dysuria  Skin:  No rashes or moles  Neuro:  No seizures or syncope  Hematological:  No bruising or bleeding  Endocrine:  Known diabetes, no thyroid disease    Physical Exam:      Visit Vitals    /45    Pulse (!) 43    Temp 97.5 °F (36.4 °C)    Resp 15    Ht 5' 11\" (1.803 m)    Wt 88 kg (194 lb)    SpO2 99%    BMI 27.06 kg/m2       Physical Exam:    Gen:  No distress, alert  HEENT:  Normal cephalic atraumatic, extra-occular movements are intact.   Neck:  Supple, No JVD  Lungs:  Clear bilaterally, no wheeze, no rales, normal effort  Heart:  Regular Rate and Rhythm, normal S1 and S2, no edema  Abdomen:  Soft, non tender, normal bowel sounds, no guarding. Extremities:  Well perfused, no cyanosis or edema  Neurological:  Awake and alert, CN's are intact, normal strength throughout extremities  Skin:  No rashes or moles  Mental Status:  Normal thought process, does not appear anxious    Laboratory Studies:    Labs reviewed    Assessment/Plan     Active Problems:    Carotid artery stenosis without cerebral infarction (6/13/2017)    Diabetes  HTN  Hyperlipidemia  Peripheral arterial disease    PLAN:    Advance diet after surgery  BS control  BP control  Pain control  Mobilize as possible  Monitor status closely in ICU.

## 2017-06-13 NOTE — PERIOP NOTES
1824:  Assumed care of patient. Resting comfortably. No signs of distress. Will continue to monitor.

## 2017-06-13 NOTE — PERIOP NOTES
Anesthesia notified of possible EKG changed based on monitor. 12 lead EKG ordered,    1525 - EKG brought to Anesthesia. Verified EKG Changes. Will contact cardiology. [facility] paged via .

## 2017-06-13 NOTE — PROCEDURES
Randal  *** FINAL REPORT ***    Name: Diane Cortez  MRN: [id number]    Inpatient  : 24 Sep 1950  HIS Order #: [id number]  [address] [facility] Visit #: [id number]  Date: 2017    TYPE OF TEST: Cerebrovascular Duplex    REASON FOR TEST  Intra-operative, Left CEA    Right Carotid:-             Proximal               Mid                 Distal  cm/s  Systolic  Diastolic  Systolic  Diastolic  Systolic  Diastolic  CCA:  Bulb:  ECA:  ICA:  ICA/CCA:    ICA Stenosis: Unknown    Right Vertebral:-  Finding:  Sys:  Marika:    Right Subclavian:    Left Carotid:-            Proximal                Mid                 Distal  cm/s  Systolic  Diastolic  Systolic  Diastolic  Systolic  Diastolic  CCA:                         115.0      23.0  Bulb:  ECA:     85.0      10.0  ICA:     87.0      24.0  ICA/CCA:  0.8       1.0    ICA Stenosis: Normal    Left Vertebral:-  Finding:  Sys:  Marika:    Left Subclavian:    INTERPRETATION/FINDINGS  Duplex images were obtained using 2-D gray scale, color flow, and  spectral Doppler analysis. 1. Intraoperative duplex exam demonstrates patent left internal  carotid artery status post endarterectomy. 2. The common and external carotid arteries are patent, with no  evidence of hemodynamically significant stenosis. ADDITIONAL COMMENTS    I have personally reviewed the data relevant to the interpretation of  this  study. TECHNOLOGIST: Debby Espinoza. Navin Tam  Signed: 2017 01:26 PM    PHYSICIAN: Serina Gonzalez.  Janette Garcia MD  Signed: 2017 08:26 PM

## 2017-06-13 NOTE — PERIOP NOTES
STAN Lincoln called PACU. Stated Dr. Renetta Fortune was on call. Anesthesia spoke to PA.  PA per anesthesia stated that she would contact the cardiologist.

## 2017-06-13 NOTE — IP AVS SNAPSHOT
Current Discharge Medication List  
  
START taking these medications Dose & Instructions Dispensing Information Comments Morning Noon Evening Bedtime  
 aspirin 81 mg chewable tablet Your last dose was: Your next dose is:    
   
   
 Dose:  81 mg Take 1 Tab by mouth daily. Quantity:  90 Tab Refills:  0 HYDROcodone-acetaminophen 5-325 mg per tablet Commonly known as:  Durrell Brittle Your last dose was: Your next dose is:    
   
   
 Dose:  1 Tab Take 1 Tab by mouth every four (4) hours as needed. Max Daily Amount: 6 Tabs. Quantity:  20 Tab Refills:  0 CONTINUE these medications which have NOT CHANGED Dose & Instructions Dispensing Information Comments Morning Noon Evening Bedtime  
 amLODIPine 5 mg tablet Commonly known as:  Christin Fast Your last dose was: Your next dose is:    
   
   
 Dose:  5 mg Take 1 Tab by mouth daily. Quantity:  90 Tab Refills:  2  
     
   
   
   
  
 atorvastatin 20 mg tablet Commonly known as:  LIPITOR Your last dose was: Your next dose is: TAKE 1 TABLET DAILY Quantity:  90 Tab Refills:  2  
     
   
   
   
  
 glipiZIDE SR 2.5 mg CR tablet Commonly known as:  GLUCOTROL XL Your last dose was: Your next dose is: TAKE 1 TABLET DAILY Quantity:  90 Tab Refills:  2  
     
   
   
   
  
 glucose blood VI test strips strip Commonly known as:  ONETOUCH ULTRA TEST Your last dose was: Your next dose is: Once daily Quantity:  100 Strip Refills:  2  
     
   
   
   
  
 hydrOXYzine HCl 25 mg tablet Commonly known as:  ATARAX Your last dose was: Your next dose is:    
   
   
 Dose:  25 mg Take 1 Tab by mouth daily as needed for Itching. Quantity:  90 Tab Refills:  2 Lancets Misc Your last dose was: Your next dose is: Once daily Quantity:  100 Each Refills:  2  
     
   
   
   
  
 lisinopril-hydroCHLOROthiazide 20-12.5 mg per tablet Commonly known as:  Nelida Fothergill Your last dose was: Your next dose is: TAKE 1 TABLET TWICE A DAY Quantity:  180 Tab Refills:  2 Where to Get Your Medications These medications were sent to [address], [address] [place] [address] Phone:  [phone]  
  aspirin 81 mg chewable tablet Information on where to get these meds will be given to you by the nurse or doctor. ! Ask your nurse or doctor about these medications HYDROcodone-acetaminophen 5-325 mg per tablet

## 2017-06-14 VITALS
TEMPERATURE: 98 F | RESPIRATION RATE: 16 BRPM | DIASTOLIC BLOOD PRESSURE: 65 MMHG | HEIGHT: 71 IN | SYSTOLIC BLOOD PRESSURE: 142 MMHG | BODY MASS INDEX: 28.4 KG/M2 | OXYGEN SATURATION: 95 % | WEIGHT: 202.82 LBS | HEART RATE: 71 BPM

## 2017-06-14 LAB
ACT BLD: 142 SECS (ref 79–138)
ACT BLD: 214 SECS (ref 79–138)

## 2017-06-14 PROCEDURE — 74011250637 HC RX REV CODE- 250/637

## 2017-06-14 PROCEDURE — 74011250637 HC RX REV CODE- 250/637: Performed by: SURGERY

## 2017-06-14 RX ORDER — GUAIFENESIN 100 MG/5ML
LIQUID (ML) ORAL
Status: COMPLETED
Start: 2017-06-14 | End: 2017-06-14

## 2017-06-14 RX ORDER — GUAIFENESIN 100 MG/5ML
81 LIQUID (ML) ORAL DAILY
Qty: 90 TAB | Refills: 0 | Status: SHIPPED | OUTPATIENT
Start: 2017-06-14 | End: 2022-04-19 | Stop reason: ALTCHOICE

## 2017-06-14 RX ORDER — GUAIFENESIN 100 MG/5ML
81 LIQUID (ML) ORAL DAILY
Status: DISCONTINUED | OUTPATIENT
Start: 2017-06-14 | End: 2017-06-14 | Stop reason: HOSPADM

## 2017-06-14 RX ORDER — HYDROCODONE BITARTRATE AND ACETAMINOPHEN 5; 325 MG/1; MG/1
1 TABLET ORAL
Qty: 20 TAB | Refills: 0 | Status: SHIPPED | OUTPATIENT
Start: 2017-06-14 | End: 2017-07-19

## 2017-06-14 RX ADMIN — Medication 81 MG: at 13:49

## 2017-06-14 RX ADMIN — AMLODIPINE BESYLATE 5 MG: 5 TABLET ORAL at 08:07

## 2017-06-14 RX ADMIN — LISINOPRIL 20 MG: 20 TABLET ORAL at 08:07

## 2017-06-14 RX ADMIN — HYDROCHLOROTHIAZIDE 12.5 MG: 25 TABLET ORAL at 08:07

## 2017-06-14 RX ADMIN — ASPIRIN 81 MG 81 MG: 81 TABLET ORAL at 13:49

## 2017-06-14 RX ADMIN — Medication 10 ML: at 05:43

## 2017-06-14 NOTE — DISCHARGE SUMMARY
Physician Discharge Summary     Patient ID:  Keyonna Devine  [id number]  73 y.o.  1950    Admit date: 6/13/2017    Discharge date: 6/14/2017      Admitting Physician: Mara Chapman MD     Discharge Physician: Zaire Hassan MD    Admission Diagnoses: Severe Left carotid artery stenosis  Discharge Diagnoses: severe left carotid artery stenosis    Procedures for this admission: Procedure(s):  left CAROTID  ENDARTERECTOMy with patch angioplasty with intraoperative ultrasound    Discharged Condition: stable    Hospital Course: The patient tolerated the carotid surgery well, he was observed in the ICU over night for neurovascular checks. He did well, on POD#1 he was tolerating a diet, voiding, out of bed ambulating. There was no hoarseness, no weakness. THe RHINA drain had only minimal serosanguinous drainage and was remove early morning. Consults: none    Significant Diagnostic Studies:     Treatments:none    Vital Signs:   Visit Vitals    /62    Pulse 72    Temp 98.4 °F (36.9 °C)    Resp 15    Ht 5' 11\" (1.803 m)    Wt 92 kg (202 lb 13.2 oz)    SpO2 97%    BMI 28.29 kg/m2       Discharge Exam: Alert and oriented, minimal left neck pain/discomfort, normal voice without hoarseness, lungs - clear, Heart - regular, abd - soft nontender, Neuro - no focal deficits, symmetrical face with no tongue deviation. Swallowing without problems.      Disposition: disharge home, resume home medications, norco as needed for pain, office will call for follow up in 2 weeks     Patient Instructions:   Current Discharge Medication List      CONTINUE these medications which have NOT CHANGED    Details   glipiZIDE SR (GLUCOTROL XL) 2.5 mg CR tablet TAKE 1 TABLET DAILY  Qty: 90 Tab, Refills: 2      atorvastatin (LIPITOR) 20 mg tablet TAKE 1 TABLET DAILY  Qty: 90 Tab, Refills: 2      lisinopril-hydroCHLOROthiazide (PRINZIDE, ZESTORETIC) 20-12.5 mg per tablet TAKE 1 TABLET TWICE A DAY  Qty: 180 Tab, Refills: 2 amLODIPine (NORVASC) 5 mg tablet Take 1 Tab by mouth daily. Qty: 90 Tab, Refills: 2      hydrOXYzine HCl (ATARAX) 25 mg tablet Take 1 Tab by mouth daily as needed for Itching. Qty: 90 Tab, Refills: 2      glucose blood VI test strips (ONETOUCH ULTRA TEST) strip Once daily  Qty: 100 Strip, Refills: 2    Associated Diagnoses: Type 2 diabetes mellitus without complication (HCC)      Lancets misc Once daily  Qty: 100 Each, Refills: 2    Associated Diagnoses: Type 2 diabetes mellitus without complication (HCC)      Aspirin 81mg per day by mouth  Norco 5/325 tabs, 1 tab every 4 hours as needed for pain. Reference discharge instructions as provided by nursing for diet, labs, medications, activity, wound care and any outpatient referrals. Follow-up with Dr Blaine Nunez  2 weeks. Nannette Navarro [address] June 14, 2017  1:16 PM

## 2017-06-14 NOTE — PROGRESS NOTES
conducted an initial consultation and Spiritual Assessment for Harshal Daniels, who is a 73 y. o.,male. Patients Primary Language is: [place]. According to the patients EMR Roman Catholic Affiliation is: No preference. The reason the Patient came to the hospital is:   Patient Active Problem List    Diagnosis Date Noted    Carotid artery stenosis without cerebral infarction 06/13/2017    Chronic low back pain 05/04/2016    HLD (hyperlipidemia) 11/11/2015    Type II diabetes mellitus ([facility] [address] 10/23/2015    HTN (hypertension) 10/20/2015        The  provided the following Interventions:  Initiated a relationship of care and support. Explored issues of teresita, spirituality and/or Orthodox needs while hospitalized. Listened empathically. Provided chaplaincy education. Provided information about Spiritual Care Services. Offered prayer and assurance of continued prayers on patient's behalf. Chart reviewed. The following outcomes were achieved:  Patient shared some information about their medical narrative and spiritual journey/beliefs. Patient processed feeling about current hospitalization. Patient expressed gratitude for the 's visit. Assessment:  Patient did not indicate any spiritual or Orthodox issues which require Spiritual Care Services interventions at this time. Patient does not have any Orthodox/cultural needs that will affect patients preferences in health care. Plan:  Chaplains will continue to follow and will provide pastoral care on an as needed or requested basis.  recommends bedside caregivers page  on duty if patient shows signs of acute spiritual or emotional distress.     [facility]   Staff [address] [facility]   [phone]

## 2017-06-14 NOTE — ROUTINE PROCESS
Per Dr. Angelina Foley, arterial line removed. Pressure held for 5 mins. No bleeding or hematoma noted. Pressure drsg applied. Pt ambulated around ICU and ICU hallway. Steady on feet, no dizziness noted. Situated in chair with call bell in hand.   Pt is voiding clear yellow urine in urinal.

## 2017-06-14 NOTE — PROGRESS NOTES
Bedside and Verbal shift change report given to Bobby Helton RN (oncoming nurse) by Martha Armenta RN (offgoing nurse). Report included the following information SBAR, Kardex, Procedure Summary, Intake/Output, MAR and Cardiac Rhythm sinus rhythm.

## 2017-06-14 NOTE — PROGRESS NOTES
[facility]   Discharge Planning/ Assessment    Reasons for Intervention: Chart reviewed. Met with pt., verified all demographics. [facility] has BC ins. [facility] Dr. Yoo is his PCP. NOK: Denny Doty, spouse, whom he lives with & designates can participate in his discharge process. Uses no DME. Independent with ADL's prior to admit. Plan: home. Available as needed. Kelly Abrams RN,ext. [phone].       High Risk Criteria  [] Yes  [x]No   Physician Referral  [] Yes  [x]No        Date    Nursing Referral  [] Yes  [x]No        Date    Patient/Family Request  [] Yes  [x]No        Date       Resources:    Medicare  [] Yes  [x]No   Medicaid  [] Yes  [x]No   No Resources  [] Yes  [x]No   Private Insurance  [x] Yes  []No    Name/Phone Number    Other  [] Yes  [x]No        (i.e. Workman's Comp)         Prior Services:    Prior Services  [] Yes  [x]No   Home Health  [] Yes  [x]No   [address]  [] Yes  [x]No        [address] of [address] Program  [] Yes  [x]No       Meals on Wheels  [] Yes  [x]No   Office on Aging  [] Yes  [x]No   Transportation Services  [] Yes  [x]No   Nursing Home  [] Yes  [x]No        Nursing Home Name    [address]  [] Yes  [x]No        [address] Name    Other       Information Source:      Information obtained from  [x] Patient  [] Parent   [] [address]  [] Child  [] Spouse   [] Significant Other/Partner   [] Friend      [] EMS    [] Nursing Home Chart          [] Other:   Chart Review  [x] Yes  []No     Family/Support System:    Patient lives with  [] Alone    [x] Spouse   [] Significant Other  [] Children  [] Caretaker   [] Parent  [] Sibling     [] Other       Other Support System:    Is the patient responsible for care of others  [] Yes  [x]No   Information of person caring for patient on  discharge    Managers financial affairs independently  [x] Yes  []No   If no, explain:      Status Prior to Admission:    Mental Status  [x] Awake  [x] Alert  [x] Oriented  [x] Quiet/Calm [] Lethargic/Sedated   [] Disoriented  [] Restless/Anxious  [] Combative   Personal Care  [] Dependent  [x] Independent Personal Care  [] Requires Assistance   Meal Preparation Ability  [x] Independent   [] Standby Assistance   [] Minimal Assistance   [] Moderate Assistance  [] Maximum Assistance     [] Total Assistance   Chores  [x] Independent with Chores   [] N/A Nursing Home Resident   [] Requires Assistance   Bowel/Bladder  [x] Continent  [] Catheter  [] Incontinent  [] Ostomy Self-Care    [] Urine Diversion Self-Care  [] Maximum Assistance     [] Total Assistance   Number of Persons needed for assistance    DME at home  [] [place], Genene Binet  [] [place], Straight   [] Commode    [] Bathroom/Grab Bars  [] Hospital Bed  [] Nebulizer  [] Oxygen           [] Raised Toilet Seat  [] Shower Chair  [] Side Rails for Bed   [] Tub Transfer Bench   [] Chana Georges  [] Dawson Zaomra      [] Other:   Vendor      Treatment Presently Receiving:    Current Treatments  [] Chemotherapy  [] Dialysis  [] Insulin  [] IVAB [x] IVF   [] O2  [] PCA   [] PT   [] RT   [] Tube Feedings   [] Wound Care     Psychosocial Evaluation:    Verbalized Knowledge of Disease Process  [] Patient  []Family   Coping with Disease Process  [] Patient  []Family   Requires Further Counseling Coping with Disease Process  [] Patient  []Family     Identified Projected Needs:    Home Health Aid  [] Yes  [x]No   Transportation  [] Yes  [x]No   Education  [] Yes  [x]No        Specific Education     Financial Counseling  [] Yes  [x]No   Inability to Care for Self/Will Require 24 hour care  [] Yes  [x]No   Pain Management  [] Yes  [x]No   Home Infusion Therapy  [] Yes  [x]No   Oxygen Therapy  [] Yes  [x]No   DME  [] Yes  [x]No   Long Term Care Placement  [] Yes  [x]No   Rehab  [] Yes  [x]No   Physical Therapy  [] Yes  [x]No   Needs Anticipated At This Time  [] Yes  [x]No     Intra-Hospital Referral:    Home Health Liasion  [] Yes  [x]No     [] Yes  [x]No   Patient Representative  [] Yes  [x]No   Staff for Teaching Needs  [] Yes  [x]No   Specialty Teaching Needs     Diabetic Educator  [] Yes  [x]No   Referral for Diabetic Educator Needed  [] Yes  [x]No  If Yes, place order for Nutritionist or Diabetic Consult     Tentative Discharge Plan:    Home with No Services  [x] Yes  []No   Home with [address]  [] Yes  [x]No        If Yes, specify type    Home Care Program  [] Yes  [x]No        If Yes, specify type    Meals on Wheels  [] Yes  [x]No   Office of Aging  [] Yes  [x]No   NHP  [] Yes  [x]No   Return to the Nursing Home  [] Yes  [x]No   Rehab Therapy  [] Yes  [x]No   Acute Rehab  [] Yes  [x]No   Subacute Rehab  [] Yes  [x]No   Private Care  [] Yes  [x]No   Substance Abuse Referral  [] Yes  [x]No   Transportation  [] Yes  [x]No   Chore Service  [] Yes  [x]No   Inpatient Hospice  [] Yes  [x]No   OP RT  [] Yes  [x] No   OP Hemo  [] Yes  [x] No   OP PT  [] Yes  [x]No   Support Group  [] Yes  [x]No   Reach to Recovery  [] Yes  [x]No   OP Oncology Clinic  [] Yes  [x]No   Clinic Appointment  [] Yes  [x]No   DME  [] Yes  [x]No   Comments    Name of D/C Planner or  Given to Patient or Family August Rast   Phone Number Pager: [phone]  [phone]   Date 06-   Time    If you are discharged home, whom do you designate to participate in your discharge plan and receive any information needed? Enter name of Reggie Mathews        Phone # of designee [phone]        Address of [address].  [id number]        Updated         Patient refused to designate any           individual

## 2017-06-14 NOTE — PROGRESS NOTES
0930: Patient walked around unit X2, sitting up in a chair, arterial line removed with no issue. Completely neurovascularly intact.

## 2017-06-14 NOTE — ACP (ADVANCE CARE PLANNING)
Patient has designated ____his spouse____________________ to participate in his/her discharge plan and to receive any needed information. Name: Siria Thorpe  Address:  [address] [place] [address]. [place]inna Guerrero.  [id number]  Phone number:  [phone]

## 2017-06-14 NOTE — OP NOTES
Alexey Thomson    Name:  Juan José Collins  MR#:  [id number]  :  1950  Account #:  [de-identified]  Date of Adm:  2017  Date of Surgery:  2017      PREOPERATIVE DIAGNOSIS: Asymptomatic severe left internal  carotid artery stenosis. POSTOPERATIVE DIAGNOSIS: Asymptomatic severe left internal  carotid artery stenosis. PROCEDURES PERFORMED  1. Left carotid endarterectomy with bovine patch angioplasty. 2. Intraoperative ultrasound. SURGEON: Marley Ayon MD    [facility]    ANESTHESIA: General.    INDICATIONS: This is a 18-year-old gentleman who presented to the  office with bilateral lower extremity claudication. Noninvasive studies  were obtained after he was found to have bilateral carotid bruits. Carotid duplex showed greater than 70% left internal carotid artery, his  right side was 50-69%. The patient was asymptomatic, and was  scheduled for a left carotid endarterectomy. FINDINGS: The patient underwent left carotid endarterectomy with  bovine patch angioplasty. Intraoperative ultrasound showed no  intraluminal abnormalities, with normal hemodynamics. The patient  was noted to be neurologically intact at the end of the procedure. COMPLICATIONS: None. ESTIMATED BLOOD LOSS: 100 mL. SPECIMENS REMOVED: None. PROCEDURE: After informed consent was obtained, the patient was  brought to the operating room, placed in supine position. General  anesthetic was administered. The left neck and chest were prepped  and draped in the usual sterile fashion. Timeout was performed. A  longitudinal incision was made over the anterior belly of the  sternocleidomastoid. The incision carried through the skin and  subcutaneous tissues. The platysma was divided with electrocautery. The incision was carried down to the internal jugular vein. The fascial  vein was identified and incised between two 3-0 silk ligatures.  The  common, internal, and external carotid arteries were all dissected free. Vessel loops were placed on the internal, external, and superior thyroid  vessels. Very mild tourniquet was placed on the common. The patient received 9000 units of heparin. After waiting 3 minutes, the  vessel loops were pulled tight, a carotid bulldog was placed on the  internal carotid artery and a profunda clamp was placed on the  common. The common carotid artery was opened with a #15 blade and  extended into the internal carotid artery with Méndez scissors. A 12-  Danish straight [place] shunt was placed in the internal carotid artery,  back-bled and placed into the common. An endarterectomy was then  performed in a subintimal plane. The proximal and distal endpoints  were obtained. The distal endpoint was tacked with several 7-0  Prolenes. An eversion endarterectomy was performed on the external.  Next, a bovine patch was sewn in place. Just prior to completion of the  patch, the clamps were re-placed, the shunt was removed. The  antegrade and retrograde flushing was performed. The patch repair  was complete, flow was restored to the external followed by the  internal carotid artery. The intraoperative ultrasound was obtained,  which showed normal hemodynamics, no intraluminal abnormalities. Once good hemostasis was obtained, the wound was then closed in  several layers. Deep tissues were reapproximated with 3-0 Vicryl in  running fashion. A 7-Danish Amaury-Garcia drain was placed through a  separate stab incision. The platysma was reapproximated with 3-0  Vicryl in running fashion. The skin was closed with 4-0 Monocryl in  running subcuticular fashion. Clean dressings were applied. The  patient was awakened, noted to be moving all 4 extremities and his  tongue was midline. The patient will be transferred to the PACU.         MD Veronique London / Aidan Rucker  D:  06/13/2017   13:35  T:  06/13/2017   23:49  Job #:  [id number]

## 2017-06-14 NOTE — PROGRESS NOTES
Reviewed discharge instructions with patient and wife, aspirin 81 mg given per Dr. Twila Hernadez order. Patient and wife agree that patient will follow up with Dr. Donneta Frankel in two weeks and continue taking aspirin 81 mg daily. Incision site care, symptoms of stroke, and symptoms of potential complication also reviewed. Patient discharged to home in care of wife, ambulated to car with RN.

## 2017-07-06 ENCOUNTER — OFFICE VISIT (OUTPATIENT)
Dept: FAMILY MEDICINE CLINIC | Age: 67
End: 2017-07-06

## 2017-07-06 VITALS
HEIGHT: 71 IN | RESPIRATION RATE: 20 BRPM | WEIGHT: 190 LBS | DIASTOLIC BLOOD PRESSURE: 80 MMHG | HEART RATE: 75 BPM | OXYGEN SATURATION: 96 % | BODY MASS INDEX: 26.6 KG/M2 | TEMPERATURE: 97.9 F | SYSTOLIC BLOOD PRESSURE: 140 MMHG

## 2017-07-06 DIAGNOSIS — E11.9 TYPE 2 DIABETES MELLITUS WITHOUT COMPLICATION, WITHOUT LONG-TERM CURRENT USE OF INSULIN (HCC): Primary | ICD-10-CM

## 2017-07-06 DIAGNOSIS — I10 ESSENTIAL HYPERTENSION: ICD-10-CM

## 2017-07-06 DIAGNOSIS — K59.09 CHRONIC CONSTIPATION: ICD-10-CM

## 2017-07-06 DIAGNOSIS — E78.00 PURE HYPERCHOLESTEROLEMIA: ICD-10-CM

## 2017-07-06 DIAGNOSIS — Z12.11 COLON CANCER SCREENING: ICD-10-CM

## 2017-07-06 DIAGNOSIS — Z11.59 NEED FOR HEPATITIS C SCREENING TEST: ICD-10-CM

## 2017-07-06 NOTE — PROGRESS NOTES
Assessment/Plan:    *Kalin was seen today for diabetes, hypertension, cholesterol problem and back pain. Diagnoses and all orders for this visit:    Type 2 diabetes mellitus without complication, without long-term current use of insulin (HCC)  -     AMB POC FUNDUS PHOTOGRAPHY WITH INTERP AND REPORT  -     METABOLIC PANEL, BASIC; Future  -     HEMOGLOBIN A1C W/O EAG; Future    Colon cancer screening  -     OCCULT BLOOD, STOOL; Future    Chronic constipation  -     OCCULT BLOOD, STOOL; Future    Essential hypertension    Pure hypercholesterolemia  -     HEPATIC FUNCTION PANEL; Future  -     LIPID PANEL; Future    Need for hepatitis C screening test  -     HEPATITIS C AB; Future        F/u 4 months. The plan was discussed with the patient. The patient verbalized understanding and is in agreement with the plan. All medication potential side effects were discussed with the patient.    -------------------------------------------------------------------------------------------------------------------        Susan Nicholas is a 73 y.o. male and presents with Diabetes; Hypertension; Cholesterol Problem; and Back Pain (pain scale 2/10)         Subjective:  Pt here for f/u. DM: will repeat level. HLD: will repeat level. On Lipitor. HTN: controlled. Pt still has not done his colonoscopy. ROS:  Constitutional: No recent weight change. No weakness/fatigue. No f/c. Skin: No rashes, change in nails/hair, itching   HENT: No HA, dizziness. No hearing loss/tinnitus. No nasal congestion/discharge. Eyes: No change in vision, double/blurred vision or eye pain/redness. Cardiovascular: No CP/palpitations. No FUNES/orthopnea/PND. Respiratory: No cough/sputum, dyspnea, wheezing. Gastointestinal: No dysphagia, reflux. No n/v. No constipation/diarrhea. No melena/rectal bleeding. Genitourinary: No dysuria, urinary hesitancy, nocturia, hematuria. No incontinence.    Musculoskeletal: No joint pain/stiffness. No muscle pain/tenderness. Endo: No heat/cold intolerance, no polyuria/polydypsia. Heme: No h/o anemia. No easy bleeding/bruising. Allergy/Immunology: No seasonal rhinitis. Denies frequent colds, sinus/ear infections. Neurological: No seizures/numbness/weakness. No paresthesias. Psychiatric:  No depression, anxiety. The problem list was updated as a part of today's visit. Patient Active Problem List   Diagnosis Code    HTN (hypertension) I10    Type II diabetes mellitus ([facility] [address] E11.9    HLD (hyperlipidemia) E78.5    Chronic low back pain M54.5, G89.29    Carotid artery stenosis without cerebral infarction I65.29       The PSH, FH were reviewed. SH:  Social History   Substance Use Topics    Smoking status: Former Smoker     Start date: 2/16/1980     Quit date: 2/16/2014    Smokeless tobacco: Never Used    Alcohol use Yes      Comment: OCCAS       Medications/Allergies:  Current Outpatient Prescriptions on File Prior to Visit   Medication Sig Dispense Refill    HYDROcodone-acetaminophen (NORCO) 5-325 mg per tablet Take 1 Tab by mouth every four (4) hours as needed. Max Daily Amount: 6 Tabs. 20 Tab 0    aspirin 81 mg chewable tablet Take 1 Tab by mouth daily. 90 Tab 0    glipiZIDE SR (GLUCOTROL XL) 2.5 mg CR tablet TAKE 1 TABLET DAILY 90 Tab 2    atorvastatin (LIPITOR) 20 mg tablet TAKE 1 TABLET DAILY 90 Tab 2    lisinopril-hydroCHLOROthiazide (PRINZIDE, ZESTORETIC) 20-12.5 mg per tablet TAKE 1 TABLET TWICE A  Tab 2    amLODIPine (NORVASC) 5 mg tablet Take 1 Tab by mouth daily. 90 Tab 2    hydrOXYzine HCl (ATARAX) 25 mg tablet Take 1 Tab by mouth daily as needed for Itching. 90 Tab 2    glucose blood VI test strips (ONETOUCH ULTRA TEST) strip Once daily 100 Strip 2    Lancets misc Once daily 100 Each 2     No current facility-administered medications on file prior to visit.          Allergies   Allergen Reactions    Metformin Diarrhea         Health Maintenance:   Health Maintenance   Topic Date Due    Hepatitis C Screening  1950    FOBT Q 1 YEAR AGE 50-75  09/24/2001    Pneumococcal 65+ Low/Medium Risk (1 of 2 - PCV13) 09/24/2016    MEDICARE YEARLY EXAM  09/24/2016    EYE EXAM RETINAL OR DILATED Q1  03/28/2017    INFLUENZA AGE 9 TO ADULT  08/01/2017    HEMOGLOBIN A1C Q6M  09/03/2017    MICROALBUMIN Q1  03/03/2018    LIPID PANEL Q1  03/03/2018    FOOT EXAM Q1  03/24/2018    GLAUCOMA SCREENING Q2Y  03/28/2018    DTaP/Tdap/Td series (2 - Td) 06/14/2026    ZOSTER VACCINE AGE 60>  Addressed       Objective:  Visit Vitals    /80 (BP 1 Location: Right arm, BP Patient Position: Sitting)    Pulse 75    Temp 97.9 °F (36.6 °C) (Oral)    Resp 20    Ht 5' 11\" (1.803 m)    Wt 190 lb (86.2 kg)    SpO2 96%    BMI 26.5 kg/m2          Nurses notes and VS reviewed. Physical Examination: General appearance - alert, well appearing, and in no distress  Chest - clear to auscultation, no wheezes, rales or rhonchi, symmetric air entry  Heart - normal rate, regular rhythm, normal S1, S2, no murmurs, rubs, clicks or gallops        Labwork and Ancillary Studies:    CBC w/Diff  Lab Results   Component Value Date/Time    WBC 8.1 03/03/2017 08:48 AM    HGB 13.0 03/03/2017 08:48 AM    PLATELET 719 56/86/7678 08:48 AM         Basic Metabolic Profile  Lab Results   Component Value Date/Time    Sodium 140 06/13/2017 09:30 AM    Potassium 4.5 06/13/2017 09:30 AM    Chloride 109 06/13/2017 09:30 AM    CO2 23 06/13/2017 09:30 AM    Anion gap 8 06/13/2017 09:30 AM    Glucose 120 06/13/2017 09:30 AM    BUN 33 06/13/2017 09:30 AM    Creatinine 1.83 06/13/2017 09:30 AM    BUN/Creatinine ratio 18 06/13/2017 09:30 AM    GFR est AA 45 06/13/2017 09:30 AM    GFR est non-AA 37 06/13/2017 09:30 AM    Calcium 8.7 06/13/2017 09:30 AM         LFT  Lab Results   Component Value Date/Time    ALT (SGPT) 13 03/03/2017 08:48 AM    AST (SGOT) 14 03/03/2017 08:48 AM    Alk. phosphatase 87 03/03/2017 08:48 AM    Bilirubin, direct 0.12 03/03/2017 08:48 AM    Bilirubin, total 0.4 03/03/2017 08:48 AM         Cholesterol  Lab Results   Component Value Date/Time    Cholesterol, total 180 03/03/2017 08:48 AM    HDL Cholesterol 30 03/03/2017 08:48 AM    LDL, calculated 103 03/03/2017 08:48 AM    Triglyceride 236 03/03/2017 08:48 AM

## 2017-07-06 NOTE — PROGRESS NOTES
Chief Complaint   Patient presents with    Diabetes    Hypertension    Cholesterol Problem    Back Pain     pain scale 2/10     1. Have you been to the ER, urgent care clinic since your last visit? Hospitalized since your last visit? No    2. Have you seen or consulted any other health care providers outside of the [facility] since your last visit? Include any pap smears or colon screening.  Yes Where: Dr. Enoc Glaser

## 2017-07-06 NOTE — PATIENT INSTRUCTIONS

## 2017-07-06 NOTE — MR AVS SNAPSHOT
Visit Information Date & Time Provider Department Dept. Phone Encounter #  
 7/6/2017  8:30 AM Boo Clinton, 3 [facility] [phone] [id number] Upcoming Health Maintenance Date Due Hepatitis C Screening 1950 FOBT Q 1 YEAR AGE 50-75 9/24/2001 Pneumococcal 65+ Low/Medium Risk (1 of 2 - PCV13) 9/24/2016 MEDICARE YEARLY EXAM 9/24/2016 EYE EXAM RETINAL OR DILATED Q1 3/28/2017 INFLUENZA AGE 9 TO ADULT 8/1/2017 HEMOGLOBIN A1C Q6M 9/3/2017 MICROALBUMIN Q1 3/3/2018 LIPID PANEL Q1 3/3/2018 FOOT EXAM Q1 3/24/2018 GLAUCOMA SCREENING Q2Y 3/28/2018 DTaP/Tdap/Td series (2 - Td) 6/14/2026 Allergies as of 7/6/2017  Review Complete On: 7/6/2017 By: Veda Brooks Severity Noted Reaction Type Reactions Metformin  11/11/2015    Diarrhea Current Immunizations  Reviewed on 3/24/2017 Name Date Influenza High Dose Vaccine PF 11/8/2016 Influenza Vaccine 10/20/2015 Tdap 6/14/2016 Not reviewed this visit You Were Diagnosed With   
  
 Codes Comments Type 2 diabetes mellitus without complication, without long-term current use of insulin (HCC)    -  Primary ICD-10-CM: E11.9 ICD-9-CM: 250.00 Colon cancer screening     ICD-10-CM: Z12.11 ICD-9-CM: V76.51 Chronic constipation     ICD-10-CM: K59.09 
ICD-9-CM: 564.00 Essential hypertension     ICD-10-CM: I10 
ICD-9-CM: 401.9 Pure hypercholesterolemia     ICD-10-CM: E78.00 ICD-9-CM: 272.0 Need for hepatitis C screening test     ICD-10-CM: Z11.59 
ICD-9-CM: V73.89 Vitals BP Pulse Temp Resp Height(growth percentile) Weight(growth percentile) 140/80 (BP 1 Location: Right arm, BP Patient Position: Sitting) 75 97.9 °F (36.6 °C) (Oral) 20 5' 11\" (1.803 m) 190 lb (86.2 kg) SpO2 BMI Smoking Status 96% 26.5 kg/m2 Former Smoker BMI and BSA Data  Body Mass Index Body Surface Area  
 26.5 kg/m 2 2.08 m 2  
  
  
 Preferred Pharmacy Pharmacy Name Phone [address] [facility] [phone] Your Updated Medication List  
  
   
This list is accurate as of: 7/6/17  8:58 AM.  Always use your most recent med list. amLODIPine 5 mg tablet Commonly known as:  Jacquetta Couch Take 1 Tab by mouth daily. aspirin 81 mg chewable tablet Take 1 Tab by mouth daily. atorvastatin 20 mg tablet Commonly known as:  LIPITOR  
TAKE 1 TABLET DAILY  
  
 glipiZIDE SR 2.5 mg CR tablet Commonly known as:  GLUCOTROL XL  
TAKE 1 TABLET DAILY  
  
 glucose blood VI test strips strip Commonly known as:  ONETOUCH ULTRA TEST Once daily HYDROcodone-acetaminophen 5-325 mg per tablet Commonly known as:  Amanda Batman Take 1 Tab by mouth every four (4) hours as needed. Max Daily Amount: 6 Tabs. hydrOXYzine HCl 25 mg tablet Commonly known as:  ATARAX Take 1 Tab by mouth daily as needed for Itching. Lancets Misc Once daily  
  
 lisinopril-hydroCHLOROthiazide 20-12.5 mg per tablet Commonly known as:  PRINZIDE, ZESTORETIC  
TAKE 1 TABLET TWICE A DAY We Performed the Following AMB POC FUNDUS PHOTOGRAPHY WITH INTERP AND REPORT [07313 CPT(R)] To-Do List   
 07/06/2017 Lab:  HEMOGLOBIN A1C W/O EAG   
  
 07/06/2017 Lab:  HEPATIC FUNCTION PANEL   
  
 07/06/2017 Lab:  HEPATITIS C AB   
  
 07/06/2017 Lab:  LIPID PANEL   
  
 07/06/2017 Lab:  METABOLIC PANEL, BASIC   
  
 07/06/2017 Lab:  OCCULT BLOOD, STOOL   
  
 07/20/2017 7:00 AM  
  Appointment with Venkatesh Via Donna Vega at [address] [place] [address] ([phone]) Patient Instructions Learning About Diabetes Food Guidelines Your Care Instructions Meal planning is important to manage diabetes. It helps keep your blood sugar at a target level (which you set with your doctor).  You don't have to eat special foods. You can eat what your family eats, including sweets once in a while. But you do have to pay attention to how often you eat and how much you eat of certain foods. You may want to work with a dietitian or a certified diabetes educator (CDE) to help you plan meals and snacks. A dietitian or CDE can also help you lose weight if that is one of your goals. What should you know about eating carbs? Managing the amount of carbohydrate (carbs) you eat is an important part of healthy meals when you have diabetes. Carbohydrate is found in many foods. · Learn which foods have carbs. And learn the amounts of carbs in different foods. ¨ Bread, cereal, pasta, and rice have about 15 grams of carbs in a serving. A serving is 1 slice of bread (1 ounce), ½ cup of cooked cereal, or 1/3 cup of cooked pasta or rice. ¨ Fruits have 15 grams of carbs in a serving. A serving is 1 small fresh fruit, such as an apple or orange; ½ of a banana; ½ cup of cooked or canned fruit; ½ cup of fruit juice; 1 cup of melon or raspberries; or 2 tablespoons of dried fruit. ¨ Milk and no-sugar-added yogurt have 15 grams of carbs in a serving. A serving is 1 cup of milk or 2/3 cup of no-sugar-added yogurt. ¨ Starchy vegetables have 15 grams of carbs in a serving. A serving is ½ cup of mashed potatoes or sweet potato; 1 cup winter squash; ½ of a small baked potato; ½ cup of cooked beans; or ½ cup cooked corn or green peas. · Learn how much carbs to eat each day and at each meal. A dietitian or CDE can teach you how to keep track of the amount of carbs you eat. This is called carbohydrate counting. · If you are not sure how to count carbohydrate grams, use the Plate Method to plan meals. It is a good, quick way to make sure that you have a balanced meal. It also helps you spread carbs throughout the day. ¨ Divide your plate by types of foods.  Put non-starchy vegetables on half the plate, meat or other protein food on one-quarter of the plate, and a grain or starchy vegetable in the final quarter of the plate. To this you can add a small piece of fruit and 1 cup of milk or yogurt, depending on how many carbs you are supposed to eat at a meal. 
· Try to eat about the same amount of carbs at each meal. Do not \"save up\" your daily allowance of carbs to eat at one meal. 
· Proteins have very little or no carbs per serving. Examples of proteins are beef, chicken, turkey, fish, eggs, tofu, cheese, cottage cheese, and peanut butter. A serving size of meat is 3 ounces, which is about the size of a deck of cards. Examples of meat substitute serving sizes (equal to 1 ounce of meat) are 1/4 cup of cottage cheese, 1 egg, 1 tablespoon of peanut butter, and ½ cup of tofu. How can you eat out and still eat healthy? · Learn to estimate the serving sizes of foods that have carbohydrate. If you measure food at home, it will be easier to estimate the amount in a serving of restaurant food. · If the meal you order has too much carbohydrate (such as potatoes, corn, or baked beans), ask to have a low-carbohydrate food instead. Ask for a salad or green vegetables. · If you use insulin, check your blood sugar before and after eating out to help you plan how much to eat in the future. · If you eat more carbohydrate at a meal than you had planned, take a walk or do other exercise. This will help lower your blood sugar. What else should you know? · Limit saturated fat, such as the fat from meat and dairy products. This is a healthy choice because people who have diabetes are at higher risk of heart disease. So choose lean cuts of meat and nonfat or low-fat dairy products. Use olive or canola oil instead of butter or shortening when cooking. · Don't skip meals. Your blood sugar may drop too low if you skip meals and take insulin or certain medicines for diabetes. · Check with your doctor before you drink alcohol. Alcohol can cause your blood sugar to drop too low. Alcohol can also cause a bad reaction if you take certain diabetes medicines. Follow-up care is a key part of your treatment and safety. Be sure to make and go to all appointments, and call your doctor if you are having problems. It's also a good idea to know your test results and keep a list of the medicines you take. Where can you learn more? Go to http://richard-christopher.info/. Enter A680 in the search box to learn more about \"Learning About Diabetes Food Guidelines. \" Current as of: March 13, 2017 Content Version: 11.3 © 6651-5517 [employer]. Care instructions adapted under license by [employer] (which disclaims liability or warranty for this information). If you have questions about a medical condition or this instruction, always ask your healthcare professional. [place] [address] any warranty or liability for your use of this information. Introducing [facility]! [employer] introduces [employer] patient portal. Now you can access parts of your medical record, email your doctor's office, and request medication refills online. 1. In your internet browser, go to [email or web address] 2. Click on the First Time User? Click Here link in the Sign In box. You will see the New Member Sign Up page. 3. Enter your [employer] Access Code exactly as it appears below. You will not need to use this code after youve completed the sign-up process. If you do not sign up before the expiration date, you must request a new code. · [employer] Access Code: [email or web address] Expires: 10/4/2017  8:58 AM 
 
4. Enter the last four digits of your Social Security Number (xxxx) and Date of Birth (mm/dd/yyyy) as indicated and click Submit. You will be taken to the next sign-up page. 5. Create a Genius Digital ID. This will be your [employer] login ID and cannot be changed, so think of one that is secure and easy to remember. 6. Create a [employer] password. You can change your password at any time. 7. Enter your Password Reset Question and Answer. This can be used at a later time if you forget your password. 8. Enter your e-mail address. You will receive e-mail notification when new information is available in [address] 9. Click Sign Up. You can now view and download portions of your medical record. 10. Click the Download Summary menu link to download a portable copy of your medical information. If you have questions, please visit the Frequently Asked Questions section of the [employer] website. Remember, [employer] is NOT to be used for urgent needs. For medical emergencies, dial 911. Now available from your iPhone and Android! Please provide this summary of care documentation to your next provider. Your primary care clinician is listed as Rikki Arriaza. If you have any questions after today's visit, please call [phone].

## 2017-07-20 PROBLEM — M79.606 ISCHEMIC REST PAIN OF LOWER EXTREMITY: Status: ACTIVE | Noted: 2017-07-20

## 2017-07-20 PROBLEM — I99.8 ISCHEMIC REST PAIN OF LOWER EXTREMITY: Status: ACTIVE | Noted: 2017-07-20

## 2017-09-28 ENCOUNTER — OFFICE VISIT (OUTPATIENT)
Dept: FAMILY MEDICINE CLINIC | Age: 67
End: 2017-09-28

## 2017-09-28 VITALS
RESPIRATION RATE: 96 BRPM | WEIGHT: 180.8 LBS | BODY MASS INDEX: 25.88 KG/M2 | SYSTOLIC BLOOD PRESSURE: 140 MMHG | HEIGHT: 70 IN | TEMPERATURE: 97.9 F | HEART RATE: 66 BPM | DIASTOLIC BLOOD PRESSURE: 64 MMHG

## 2017-09-28 DIAGNOSIS — E11.9 TYPE 2 DIABETES MELLITUS WITHOUT COMPLICATION, WITHOUT LONG-TERM CURRENT USE OF INSULIN (HCC): Primary | ICD-10-CM

## 2017-09-28 DIAGNOSIS — E78.00 PURE HYPERCHOLESTEROLEMIA: ICD-10-CM

## 2017-09-28 RX ORDER — ZOLPIDEM TARTRATE 5 MG/1
5 TABLET ORAL
Qty: 30 TAB | Refills: 0 | Status: SHIPPED | OUTPATIENT
Start: 2017-09-28 | End: 2022-04-19

## 2017-09-28 NOTE — PROGRESS NOTES
Assessment/Plan:    *Diagnoses and all orders for this visit:    1. Type 2 diabetes mellitus without complication, without long-term current use of insulin (HCC)  -     METABOLIC PANEL, BASIC; Future  -     HEPATIC FUNCTION PANEL; Future  -     CBC WITH AUTOMATED DIFF; Future  -     HEMOGLOBIN A1C W/O EAG; Future    2. Pure hypercholesterolemia  -     LIPID PANEL; Future    Other orders  -     zolpidem (AMBIEN) 5 mg tablet; Take 1 Tab by mouth nightly as needed for Sleep. Warned pt about the habit forming tendencies of Ambien, he will use itr carefully. Was also advised to try and skip nights in between. Discussed sleep hygiene with him and use of sleepy time teas. No stimulating activities before bedtime. F/u in 1 month with labs prior. The plan was discussed with the patient. The patient verbalized understanding and is in agreement with the plan. All medication potential side effects were discussed with the patient.    -------------------------------------------------------------------------------------------------------------------        Janee Ivy is a 78 y.o. male and presents with Sleep Problem         Subjective:  Pt returns today due to sleep issues. He recently completed all the surgical procedures he needed to have done with [facility]. He feels great, the pain in his legs has resolved. Then suddenly, out of nowhere, he has developed a sleep issue. Has laid in bed until 6 am at times, not being able to fall asleep. He is irritable from lack of rest.  He tried his wife's Trazodone 150 mg but nothing. Then tried some older Ambien she had and had a good night. He has requested a Rx for Ambien. DM: his last A1c in the hospital in July was 5.9%. HTN: borderline but acceptable. HLD: needs to be repeated. Was mildly elevated but needs to be controlled well with his PVD issue. ROS:  Constitutional: No recent weight change. No weakness/fatigue. No f/c.    Skin: No rashes, change in nails/hair, itching   HENT: No HA, dizziness. No hearing loss/tinnitus. No nasal congestion/discharge. Eyes: No change in vision, double/blurred vision or eye pain/redness. Cardiovascular: No CP/palpitations. No FUNES/orthopnea/PND. Respiratory: No cough/sputum, dyspnea, wheezing. Gastointestinal: No dysphagia, reflux. No n/v. No constipation/diarrhea. No melena/rectal bleeding. Genitourinary: No dysuria, urinary hesitancy, nocturia, hematuria. No incontinence. Musculoskeletal: No joint pain/stiffness. No muscle pain/tenderness. Endo: No heat/cold intolerance, no polyuria/polydypsia. Heme: No h/o anemia. No easy bleeding/bruising. Allergy/Immunology: No seasonal rhinitis. Denies frequent colds, sinus/ear infections. Neurological: No seizures/numbness/weakness. No paresthesias. Psychiatric:  No depression, anxiety. The problem list was updated as a part of today's visit. 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 SH:  Social History   Substance Use Topics    Smoking status: Former Smoker     Start date: 2/16/1980     Quit date: 2/16/2014    Smokeless tobacco: Never Used    Alcohol use Yes      Comment: OCCAS       Medications/Allergies:  Current Outpatient Prescriptions on File Prior to Visit   Medication Sig Dispense Refill    aspirin 81 mg chewable tablet Take 1 Tab by mouth daily.  90 Tab 0    glipiZIDE SR (GLUCOTROL XL) 2.5 mg CR tablet TAKE 1 TABLET DAILY 90 Tab 2    atorvastatin (LIPITOR) 20 mg tablet TAKE 1 TABLET DAILY 90 Tab 2    lisinopril-hydroCHLOROthiazide (PRINZIDE, ZESTORETIC) 20-12.5 mg per tablet TAKE 1 TABLET TWICE A  Tab 2    amLODIPine (NORVASC) 5 mg tablet Take 1 Tab by mouth daily. 90 Tab 2    hydrOXYzine HCl (ATARAX) 25 mg tablet Take 1 Tab by mouth daily as needed for Itching. (Patient taking differently: Take 25 mg by mouth daily as needed for Itching. Indications: CHECK WITH PT. IF HE STILL TAKES THIS MEDICATION) 90 Tab 2    glucose blood VI test strips (ONETOUCH ULTRA TEST) strip Once daily 100 Strip 2    Lancets misc Once daily 100 Each 2    oxyCODONE-acetaminophen (PERCOCET) 5-325 mg per tablet Take 1-2 Tabs by mouth every four (4) hours as needed. Max Daily Amount: 12 Tabs. Indications: Pain 20 Tab 0     No current facility-administered medications on file prior to visit. Allergies   Allergen Reactions    Metformin Other (comments)     Causes constipation         Health Maintenance:   Health Maintenance   Topic Date Due    Hepatitis C Screening  1950    FOBT Q 1 YEAR AGE 50-75  09/24/2001    Pneumococcal 65+ Low/Medium Risk (1 of 2 - PCV13) 09/24/2016    MEDICARE YEARLY EXAM  09/24/2016    INFLUENZA AGE 9 TO ADULT  08/01/2017    HEMOGLOBIN A1C Q6M  01/22/2018    MICROALBUMIN Q1  03/03/2018    LIPID PANEL Q1  03/03/2018    FOOT EXAM Q1  03/24/2018    GLAUCOMA SCREENING Q2Y  03/28/2018    EYE EXAM RETINAL OR DILATED Q1  07/07/2018    DTaP/Tdap/Td series (2 - Td) 06/14/2026    ZOSTER VACCINE AGE 60>  Addressed       Objective:  Visit Vitals    /64 (BP 1 Location: Left arm, BP Patient Position: Sitting)    Pulse 66    Temp 97.9 °F (36.6 °C) (Oral)    Resp (!) 96    Ht 5' 10\" (1.778 m)    Wt 180 lb 12.8 oz (82 kg)    BMI 25.94 kg/m2          Nurses notes and VS reviewed.       Physical Examination: General appearance - alert, well appearing, and in no distress  Chest - clear to auscultation, no wheezes, rales or rhonchi, symmetric air entry  Heart - normal rate, regular rhythm, normal S1, S2, no murmurs, rubs, clicks or gallops        Labwork and Ancillary Studies:    CBC w/Diff  Lab Results   Component Value Date/Time    WBC 9.6 07/23/2017 06:02 AM    HGB 7.5 07/23/2017 06:02 AM    PLATELET 967 05/09/9912 06:02 AM         Basic Metabolic Profile  Lab Results   Component Value Date/Time    Sodium 138 07/23/2017 06:02 AM    Potassium 4.1 07/23/2017 06:02 AM    Chloride 105 07/23/2017 06:02 AM    CO2 27 07/23/2017 06:02 AM    Anion gap 8 06/13/2017 09:30 AM    Glucose 131 07/23/2017 06:02 AM    BUN 33 07/23/2017 06:02 AM    Creatinine 1.9 07/23/2017 06:02 AM    BUN/Creatinine ratio 18 06/13/2017 09:30 AM    GFR est AA 46.0 07/23/2017 06:02 AM    GFR est non-AA 38 07/23/2017 06:02 AM    Calcium 8.2 07/23/2017 06:02 AM         LFT  Lab Results   Component Value Date/Time    ALT (SGPT) 13 03/03/2017 08:48 AM    AST (SGOT) 14 03/03/2017 08:48 AM    Alk.  phosphatase 87 03/03/2017 08:48 AM    Bilirubin, direct 0.12 03/03/2017 08:48 AM    Bilirubin, total 0.4 03/03/2017 08:48 AM         Cholesterol  Lab Results   Component Value Date/Time    Cholesterol, total 180 03/03/2017 08:48 AM    HDL Cholesterol 30 03/03/2017 08:48 AM    LDL, calculated 103 03/03/2017 08:48 AM    Triglyceride 236 03/03/2017 08:48 AM

## 2017-09-28 NOTE — PATIENT INSTRUCTIONS

## 2017-09-28 NOTE — PROGRESS NOTES
Vini Davis is a 78 y.o. male is here for sleep issues, states he has been having trouble sleeping. States its been going on for about a month. 1. Have you been to the ER, urgent care clinic since your last visit? Hospitalized since your last visit? No    2. Have you seen or consulted any other health care providers outside of the [address] [place] [address] since your last visit? Include any pap smears or colon screening. Dr. Isabell Jansen for surgery.       Health Maintenance Due   Topic Date Due    Hepatitis C Screening  1950    FOBT Q 1 YEAR AGE 50-75  09/24/2001    Pneumococcal 65+ Low/Medium Risk (1 of 2 - PCV13) 09/24/2016    MEDICARE YEARLY EXAM  09/24/2016    INFLUENZA AGE 9 TO ADULT  08/01/2017

## 2017-09-28 NOTE — MR AVS SNAPSHOT
Visit Information Date & Time Provider Department Dept. Phone Encounter #  
 9/28/2017  2:00 PM Todd Minor, 3 [facility] [phone] [id number] Upcoming Health Maintenance Date Due Hepatitis C Screening 1950 FOBT Q 1 YEAR AGE 50-75 9/24/2001 Pneumococcal 65+ Low/Medium Risk (1 of 2 - PCV13) 9/24/2016 MEDICARE YEARLY EXAM 9/24/2016 INFLUENZA AGE 9 TO ADULT 8/1/2017 HEMOGLOBIN A1C Q6M 1/22/2018 MICROALBUMIN Q1 3/3/2018 LIPID PANEL Q1 3/3/2018 FOOT EXAM Q1 3/24/2018 GLAUCOMA SCREENING Q2Y 3/28/2018 EYE EXAM RETINAL OR DILATED Q1 7/7/2018 DTaP/Tdap/Td series (2 - Td) 6/14/2026 Allergies as of 9/28/2017  Review Complete On: 9/28/2017 By: Todd Minor MD  
  
 Severity Noted Reaction Type Reactions Metformin  11/11/2015    Other (comments) Causes constipation Current Immunizations  Reviewed on 3/24/2017 Name Date Influenza High Dose Vaccine PF 11/8/2016 Influenza Vaccine 10/20/2015 Tdap 6/14/2016 Not reviewed this visit You Were Diagnosed With   
  
 Codes Comments Type 2 diabetes mellitus without complication, without long-term current use of insulin (HCC)    -  Primary ICD-10-CM: E11.9 ICD-9-CM: 250.00 Pure hypercholesterolemia     ICD-10-CM: E78.00 ICD-9-CM: 272.0 Vitals BP Pulse Temp Resp Height(growth percentile) Weight(growth percentile) 140/64 (BP 1 Location: Left arm, BP Patient Position: Sitting) 66 97.9 °F (36.6 °C) (Oral) (!) 96 5' 10\" (1.778 m) 180 lb 12.8 oz (82 kg) BMI Smoking Status 25.94 kg/m2 Former Smoker BMI and BSA Data Body Mass Index Body Surface Area  
 25.94 kg/m 2 2.01 m 2 Preferred Pharmacy [facility] [phone] Your Updated Medication List  
  
   
This list is accurate as of: 9/28/17  2:53 PM.  Always use your most recent med list. amLODIPine 5 mg tablet Commonly known as:  Marisela Michael Take 1 Tab by mouth daily. aspirin 81 mg chewable tablet Take 1 Tab by mouth daily. atorvastatin 20 mg tablet Commonly known as:  LIPITOR  
TAKE 1 TABLET DAILY  
  
 glipiZIDE SR 2.5 mg CR tablet Commonly known as:  GLUCOTROL XL  
TAKE 1 TABLET DAILY  
  
 glucose blood VI test strips strip Commonly known as:  ONETOUCH ULTRA TEST Once daily  
  
 hydrOXYzine HCl 25 mg tablet Commonly known as:  ATARAX Take 1 Tab by mouth daily as needed for Itching. Lancets Misc Once daily  
  
 lisinopril-hydroCHLOROthiazide 20-12.5 mg per tablet Commonly known as:  PRINZIDE, ZESTORETIC  
TAKE 1 TABLET TWICE A DAY  
  
 oxyCODONE-acetaminophen 5-325 mg per tablet Commonly known as:  PERCOCET Take 1-2 Tabs by mouth every four (4) hours as needed. Max Daily Amount: 12 Tabs. Indications: Pain  
  
 zolpidem 5 mg tablet Commonly known as:  AMBIEN Take 1 Tab by mouth nightly as needed for Sleep. Prescriptions Printed Refills  
 zolpidem (AMBIEN) 5 mg tablet 0 Sig: Take 1 Tab by mouth nightly as needed for Sleep. Class: Print Route: Oral  
  
To-Do List   
 09/28/2017 Lab:  CBC WITH AUTOMATED DIFF   
  
 09/28/2017 Lab:  HEMOGLOBIN A1C W/O EAG   
  
 09/28/2017 Lab:  HEPATIC FUNCTION PANEL   
  
 09/28/2017 Lab:  LIPID PANEL   
  
 09/28/2017 Lab:  METABOLIC PANEL, BASIC Patient Instructions Learning About Diabetes Food Guidelines Your Care Instructions Meal planning is important to manage diabetes. It helps keep your blood sugar at a target level (which you set with your doctor). You don't have to eat special foods. You can eat what your family eats, including sweets once in a while. But you do have to pay attention to how often you eat and how much you eat of certain foods. You may want to work with a dietitian or a certified diabetes educator (CDE) to help you plan meals and snacks. A dietitian or CDE can also help you lose weight if that is one of your goals. What should you know about eating carbs? Managing the amount of carbohydrate (carbs) you eat is an important part of healthy meals when you have diabetes. Carbohydrate is found in many foods. · Learn which foods have carbs. And learn the amounts of carbs in different foods. ¨ Bread, cereal, pasta, and rice have about 15 grams of carbs in a serving. A serving is 1 slice of bread (1 ounce), ½ cup of cooked cereal, or 1/3 cup of cooked pasta or rice. ¨ Fruits have 15 grams of carbs in a serving. A serving is 1 small fresh fruit, such as an apple or orange; ½ of a banana; ½ cup of cooked or canned fruit; ½ cup of fruit juice; 1 cup of melon or raspberries; or 2 tablespoons of dried fruit. ¨ Milk and no-sugar-added yogurt have 15 grams of carbs in a serving. A serving is 1 cup of milk or 2/3 cup of no-sugar-added yogurt. ¨ Starchy vegetables have 15 grams of carbs in a serving. A serving is ½ cup of mashed potatoes or sweet potato; 1 cup winter squash; ½ of a small baked potato; ½ cup of cooked beans; or ½ cup cooked corn or green peas. · Learn how much carbs to eat each day and at each meal. A dietitian or CDE can teach you how to keep track of the amount of carbs you eat. This is called carbohydrate counting. · If you are not sure how to count carbohydrate grams, use the Plate Method to plan meals. It is a good, quick way to make sure that you have a balanced meal. It also helps you spread carbs throughout the day. ¨ Divide your plate by types of foods. Put non-starchy vegetables on half the plate, meat or other protein food on one-quarter of the plate, and a grain or starchy vegetable in the final quarter of the plate.  To this you can add a small piece of fruit and 1 cup of milk or yogurt, depending on how many carbs you are supposed to eat at a meal. 
· Try to eat about the same amount of carbs at each meal. Do not \"save up\" your daily allowance of carbs to eat at one meal. 
· Proteins have very little or no carbs per serving. Examples of proteins are beef, chicken, turkey, fish, eggs, tofu, cheese, cottage cheese, and peanut butter. A serving size of meat is 3 ounces, which is about the size of a deck of cards. Examples of meat substitute serving sizes (equal to 1 ounce of meat) are 1/4 cup of cottage cheese, 1 egg, 1 tablespoon of peanut butter, and ½ cup of tofu. How can you eat out and still eat healthy? · Learn to estimate the serving sizes of foods that have carbohydrate. If you measure food at home, it will be easier to estimate the amount in a serving of restaurant food. · If the meal you order has too much carbohydrate (such as potatoes, corn, or baked beans), ask to have a low-carbohydrate food instead. Ask for a salad or green vegetables. · If you use insulin, check your blood sugar before and after eating out to help you plan how much to eat in the future. · If you eat more carbohydrate at a meal than you had planned, take a walk or do other exercise. This will help lower your blood sugar. What else should you know? · Limit saturated fat, such as the fat from meat and dairy products. This is a healthy choice because people who have diabetes are at higher risk of heart disease. So choose lean cuts of meat and nonfat or low-fat dairy products. Use olive or canola oil instead of butter or shortening when cooking. · Don't skip meals. Your blood sugar may drop too low if you skip meals and take insulin or certain medicines for diabetes. · Check with your doctor before you drink alcohol. Alcohol can cause your blood sugar to drop too low. Alcohol can also cause a bad reaction if you take certain diabetes medicines. Follow-up care is a key part of your treatment and safety.  Be sure to make and go to all appointments, and call your doctor if you are having problems. It's also a good idea to know your test results and keep a list of the medicines you take. Where can you learn more? Go to http://richard-christopher.info/. Enter O404 in the search box to learn more about \"Learning About Diabetes Food Guidelines. \" Current as of: March 13, 2017 Content Version: 11.3 © 8584-4009 [employer] Care instructions adapted under license by [employer] (which disclaims liability or warranty for this information). If you have questions about a medical condition or this instruction, always ask your healthcare professional. [address] any warranty or liability for your use of this information. Introducing hospitals & HEALTH SERVICES! Frankie Boswell introduces [employer] patient portal. Now you can access parts of your medical record, email your doctor's office, and request medication refills online. 1. In your internet browser, go to [email or web address] 2. Click on the First Time User? Click Here link in the Sign In box. You will see the New Member Sign Up page. 3. Enter your [employer] Access Code exactly as it appears below. You will not need to use this code after youve completed the sign-up process. If you do not sign up before the expiration date, you must request a new code. · [employer] Access Code: [email or web address] Expires: 10/4/2017  8:58 AM 
 
4. Enter the last four digits of your Social Security Number (xxxx) and Date of Birth (mm/dd/yyyy) as indicated and click Submit. You will be taken to the next sign-up page. 5. Create a eSnipst ID. This will be your [employer] login ID and cannot be changed, so think of one that is secure and easy to remember. 6. Create a [employer] password. You can change your password at any time. 7. Enter your Password Reset Question and Answer.  This can be used at a later time if you forget your password. 8. Enter your e-mail address. You will receive e-mail notification when new information is available in [address] 9. Click Sign Up. You can now view and download portions of your medical record. 10. Click the Download Summary menu link to download a portable copy of your medical information. If you have questions, please visit the Frequently Asked Questions section of the [employer] website. Remember, [employer] is NOT to be used for urgent needs. For medical emergencies, dial 911. Now available from your iPhone and Android! Please provide this summary of care documentation to your next provider. Your primary care clinician is listed as Rikki 51. If you have any questions after today's visit, please call [phone].

## 2022-03-18 PROBLEM — M79.606 ISCHEMIC REST PAIN OF LOWER EXTREMITY: Status: ACTIVE | Noted: 2017-07-20

## 2022-03-18 PROBLEM — I99.8 ISCHEMIC REST PAIN OF LOWER EXTREMITY: Status: ACTIVE | Noted: 2017-07-20

## 2022-03-20 PROBLEM — I65.29 CAROTID ARTERY STENOSIS WITHOUT CEREBRAL INFARCTION: Status: ACTIVE | Noted: 2017-06-13

## 2023-03-27 ENCOUNTER — HOSPITAL ENCOUNTER (OUTPATIENT)
Facility: HOSPITAL | Age: 73
Setting detail: RECURRING SERIES
Discharge: HOME | End: 2023-03-30
Payer: COMMERCIAL

## 2023-03-27 PROCEDURE — 97162 PT EVAL MOD COMPLEX 30 MIN: CPT

## 2023-03-27 NOTE — PROGRESS NOTES
PHYSICAL / OCCUPATIONAL THERAPY - DAILY TREATMENT NOTE (updated )  For Eval visit    Patient Name: Jarvis Walters III    Date: 3/27/2023    : 1950  Insurance: Payor: [employer] / Plan: [facility] / Product Type: *No Product type* /      Patient  verified yes     Visit #   Current / Total 1 8   Time   In / Out 10:02 10:40   Pain   In / Out 8/10 6/10   Subjective Functional Status/Changes: See initial eval   Changes to:  Meds, Allergies, Med Hx, Sx Hx?  If yes, update Summary List no       TREATMENT AREA =  Left shoulder pain [M25.512]    OBJECTIVE           33 min   Eval - untimed                      Therapeutic Procedures:  Tx Min Billable or 1:1 Min (if diff from Tx Min) Procedure, Rationale, Specifics   5  [id number] Self Care/Home Management (timed):  improve patient knowledge and understanding of pain reducing techniques, positioning, home safety, and physical therapy expectations, procedures and progression  to improve patient's ability to progress to PLOF and address remaining functional goals.  (see flow sheet as applicable)     Details if applicable:            Details if applicable:            Details if applicable:            Details if applicable:     5  [facility] Totals Reminder: bill using total billable min of TIMED therapeutic procedures (example: do not include dry needle or estim unattended, both untimed codes, in totals to left)  8-22 min = 1 unit; 23-37 min = 2 units; 38-52 min = 3 units; 53-67 min = 4 units; 68-82 min = 5 units   Total Total     [x]  Patient Education billed concurrently with other procedures   [] Review HEP    [] Progressed/Changed HEP, detail:    [x] Other detail:   Pt educated on importance of posture and body mechanics. Reviewed sleeping positions. Pt educated on red flags and to seek immediate medical attention/911 if those occur. Reviewed POC and goals. Pt reports understanding.    Objective Information/Functional Measures/Assessment    See initial eval    Patient will

## 2023-03-27 NOTE — THERAPY EVALUATION
[address] [place] [address], [address] [place] [address] CO:[phone] Fx: [phone]  Plan of Care / Statement of Necessity for Physical Therapy Services     Patient Name: Jorge Gonzalez : 1950   Medical   Diagnosis: Left shoulder pain [M25.512] Treatment Diagnosis:  M25.512  LEFT SHOULDER PAIN    Onset Date: Approximately 4 weeks prior to PT eval     Referral Source: Kelly Salinas MD Start of Care [facility]): 3/27/2023   Prior Hospitalization: See medical history Provider #: [id number]   Prior Level of Function: Complete reaching activities, overhead motions, driving and sleeping without L shoulder pain   Comorbidities: Current L UE radicular sxs, Grade I Anterolisthesis C5-C6, C/S spondylosis with foraminal stenosis, DM, HTN, Chronic Kidney Dz, PAD, L Carotid Endarectomy, R Carotid Thromboendarectomy 22 (pt denies restrictions on activity)   Medications: Verified on Patient Summary List     Assessment / key information:  Pt is a 70 y/o male reporting L shoulder pain and L UE radicular pain that started approximately 4 weeks ago. Pt reports waking up with L shoulder pain and L UE weakness (had lifted multiple boxes the day before, but denies injury). X-ray of L shoulder was normal. X-ray and MRI of c/s indicating Grade I Anterolisthesis C5-C6 (without dynamic instability), C/S spondylosis and foraminal stenosis. Pain is located L shoulder with constant radiation of burning pain, throbbing pain, numbness and tingling from L shoulder to elbow. Pt experiences intermittent sharp pain from L elbow to wrist that occurs with reaching. Pt reports intermittent upper back pain, but denies neck pain. Pain ranges 8-10/10, and increases with R side-lying, using L arm to turn steering wheel and reaching activities. Decreases temporarily with L arm resting on head. 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  Pt has had

## (undated) DEVICE — SUT PROL 6-0 24IN BV1 DA BLU --

## (undated) DEVICE — SOL IRRIGATION INJ NACL 0.9% 500ML BTL

## (undated) DEVICE — (D)STRIP SKN CLSR 0.5X4IN WHT --

## (undated) DEVICE — STERILE POLYISOPRENE POWDER-FREE SURGICAL GLOVES: Brand: PROTEXIS

## (undated) DEVICE — Device: Brand: FABCO

## (undated) DEVICE — DRAPE,THYROID,SOFT,STERILE: Brand: MEDLINE

## (undated) DEVICE — KIT DRNGE W/ 100MLBULB JP

## (undated) DEVICE — DEPAUL AV FISTULA PACK: Brand: MEDLINE INDUSTRIES, INC.

## (undated) DEVICE — REM POLYHESIVE ADULT PATIENT RETURN ELECTRODE: Brand: VALLEYLAB

## (undated) DEVICE — NDL PRT INJ NSAF BLNT 18GX1.5 --

## (undated) DEVICE — GOWN,SIRUS,FABRNF,XL,20/CS: Brand: MEDLINE

## (undated) DEVICE — 3M™ TEGADERM™ HP TRANSPARENT FILM DRESSING FRAME STYLE, 9534HP, 2-3/8 X 2-3/4 IN (6 CM X 7 CM), 100/CT 4CT/CASE: Brand: 3M™ TEGADERM™

## (undated) DEVICE — SUTURE MCRYL SZ 4-0 L18IN ABSRB UD L19MM PS-2 3/8 CIR PRIM Y496G

## (undated) DEVICE — PREP SKN CHLRAPRP 26ML TNT -- CONVERT TO ITEM 373320

## (undated) DEVICE — SUTURE VCRL SZ 3-0 L27IN ABSRB UD L26MM SH 1/2 CIR J416H

## (undated) DEVICE — HEX-LOCKING BLADE ELECTRODE: Brand: EDGE

## (undated) DEVICE — SUTURE VCRL SZ 3-0 L27IN ABSRB VLT L26MM SH 1/2 CIR J316H

## (undated) DEVICE — DRAPE: MAGNETIC 12X16 30/CS: Brand: MEDICAL ACTION INDUSTRIES

## (undated) DEVICE — Z INACTIVE PER BARD TRAY URO DIA16FR INF CTRL F COMPLT CARE 350ML URIN M

## (undated) DEVICE — 20 ML SYRINGE REGULAR TIP: Brand: MONOJECT

## (undated) DEVICE — COVER TRNSDUC W6XL96IN POLY TELESCOPICALLY FLD W/ ATTCH FRM

## (undated) DEVICE — DERMABOND SKIN ADH 0.7ML -- DERMABOND ADVANCED 12/BX

## (undated) DEVICE — DRAIN SURG L1 4IN 7MM RND HUBLESS

## (undated) DEVICE — SUTURE PROL SZ 7-0 L24IN NONABSORBABLE BLU BV-1 L9.3MM 3/8 8304H

## (undated) DEVICE — SUTURE PERMAHAND SZ 2-0 L12X18IN NONABSORBABLE BLK SILK A185H

## (undated) DEVICE — DRAPE THER FLUID WARMING 66X44 IN FLAT SLUSH DBL DISC ORS

## (undated) DEVICE — VESSEL LOOPS,MINI, BLUE: Brand: DEVON

## (undated) DEVICE — VESSEL LOOPS,MAXI, BLUE: Brand: DEVON

## (undated) DEVICE — SUTURE PROL SZ 5-0 L24IN NONABSORBABLE BLU L9.3MM BV-1 3/8 9702H

## (undated) DEVICE — SYR 10ML CTRL LR LCK NSAF LF --

## (undated) DEVICE — X-RAY DETECTABLE SPONGES,12 PLY: Brand: VISTEC

## (undated) DEVICE — SUTURE PERMAHAND SZ 3-0 L18IN NONABSORBABLE BLK SILK BRAID A184H

## (undated) DEVICE — TOWEL,OR,DSP,ST,BLUE,STD,2/PK,40PK/CS: Brand: MEDLINE

## (undated) DEVICE — SUT SLK 0 18IN TIE MP BLK --

## (undated) DEVICE — SUTURE VCRL SZ 3-0 L27IN ABSRB UD L19MM PS-2 3/8 CIR PRIM J427H

## (undated) DEVICE — VESSEL LOOPS,MAXI, RED: Brand: DEVON

## (undated) DEVICE — SUT SLK 3-0 30IN SH BLK --

## (undated) DEVICE — CATH URETH FOL 2W SH 18FRX5ML --

## (undated) DEVICE — TAPE UMB 1/8X30IN MP COT WHT --

## (undated) DEVICE — SUT SLK 4-0 18IN TIE MP BLK --

## (undated) DEVICE — SUTURE PERMA-HAND SZ 0 L24IN NONABSORBABLE BLK W/O NDL SILK SA76G

## (undated) DEVICE — COVER US PRB W15XL120CM W/ GEL RUBBERBAND TAPE STRP FLD GEN

## (undated) DEVICE — NEEDLE HYPO 25GA L1.5IN BVL ORIENTED ECLIPSE

## (undated) DEVICE — INTENDED TO BE USED TO OCCLUDE, RETRACT AND IDENTIFY ARTERIES, VEINS, TENDONS AND NERVES IN SURGICAL PROCEDURES: Brand: STERION®  VESSEL LOOP

## (undated) DEVICE — CAROTID ARTERY SHUNT KIT,RADIOPAQUE LINE, STRAIGHT: Brand: ARGYLE

## (undated) DEVICE — SUTURE VCRL SZ 3-0 L18IN ABSRB UD L26MM SH 1/2 CIR J864D

## (undated) DEVICE — (D)GLOVE SURG TRIFLX 7.5 PWD L -- DISC BY MFR USE ITEM 302993

## (undated) DEVICE — SUT SLK 2-0SH 30IN BLK --

## (undated) DEVICE — KENDALL SCD EXPRESS SLEEVES, KNEE LENGTH, MEDIUM: Brand: KENDALL SCD

## (undated) DEVICE — SPONGE LAP 18X18IN STRL -- 5/PK